# Patient Record
Sex: FEMALE | Race: WHITE | NOT HISPANIC OR LATINO | Employment: UNEMPLOYED
[De-identification: names, ages, dates, MRNs, and addresses within clinical notes are randomized per-mention and may not be internally consistent; named-entity substitution may affect disease eponyms.]

---

## 2018-06-07 ENCOUNTER — HOSPITAL (OUTPATIENT)
Dept: OTHER | Age: 63
End: 2018-06-07
Attending: INTERNAL MEDICINE

## 2019-12-30 PROCEDURE — 93018 CV STRESS TEST I&R ONLY: CPT | Performed by: INTERNAL MEDICINE

## 2019-12-30 PROCEDURE — 93016 CV STRESS TEST SUPVJ ONLY: CPT | Performed by: INTERNAL MEDICINE

## 2019-12-30 PROCEDURE — 78452 HT MUSCLE IMAGE SPECT MULT: CPT | Performed by: INTERNAL MEDICINE

## 2023-01-22 ENCOUNTER — ANESTHESIA EVENT (OUTPATIENT)
Dept: SURGERY | Age: 68
End: 2023-01-22

## 2023-01-23 ENCOUNTER — HOSPITAL ENCOUNTER (OUTPATIENT)
Age: 68
Discharge: HOME | End: 2023-01-23
Attending: OPHTHALMOLOGY | Admitting: OPHTHALMOLOGY

## 2023-01-23 ENCOUNTER — ANESTHESIA (OUTPATIENT)
Dept: SURGERY | Age: 68
End: 2023-01-23

## 2023-01-23 VITALS
SYSTOLIC BLOOD PRESSURE: 129 MMHG | DIASTOLIC BLOOD PRESSURE: 69 MMHG | OXYGEN SATURATION: 97 % | RESPIRATION RATE: 20 BRPM | WEIGHT: 157 LBS | HEART RATE: 65 BPM | HEIGHT: 63 IN | BODY MASS INDEX: 27.82 KG/M2 | TEMPERATURE: 96.8 F

## 2023-01-23 PROCEDURE — A66984 ANES EXTRACAPSULAR CATARACT REMOV W/INSR: Performed by: ANESTHESIOLOGY

## 2023-01-23 PROCEDURE — 10002800 HB RX 250 W HCPCS: Performed by: OPHTHALMOLOGY

## 2023-01-23 PROCEDURE — 10002801 HB RX 250 W/O HCPCS

## 2023-01-23 PROCEDURE — 13000037 HB COMPLEX CASE EACH ADD MINUTE: Performed by: OPHTHALMOLOGY

## 2023-01-23 PROCEDURE — 13000036 HB COMPLEX  CASE S/U + 1ST 15 MIN: Performed by: OPHTHALMOLOGY

## 2023-01-23 PROCEDURE — 10002801 HB RX 250 W/O HCPCS: Performed by: OPHTHALMOLOGY

## 2023-01-23 PROCEDURE — 13000006 HB ANESTHESIA MAC S/U + 1ST 15 MIN: Performed by: OPHTHALMOLOGY

## 2023-01-23 PROCEDURE — V2632 POST CHMBR INTRAOCULAR LENS: HCPCS | Performed by: OPHTHALMOLOGY

## 2023-01-23 PROCEDURE — 10002807 HB RX 258: Performed by: ANESTHESIOLOGY

## 2023-01-23 PROCEDURE — 10002803 HB RX 637: Performed by: OPHTHALMOLOGY

## 2023-01-23 PROCEDURE — 10006026 HB SUPPLY 276: Performed by: OPHTHALMOLOGY

## 2023-01-23 PROCEDURE — 10002803 HB RX 637

## 2023-01-23 PROCEDURE — 10006023 HB SUPPLY 272: Performed by: OPHTHALMOLOGY

## 2023-01-23 PROCEDURE — 13000007 HB ANESTHESIA MAC EA ADD MINUTE: Performed by: OPHTHALMOLOGY

## 2023-01-23 PROCEDURE — 10002800 HB RX 250 W HCPCS: Performed by: ANESTHESIOLOGY

## 2023-01-23 PROCEDURE — 13000001 HB PHASE II RECOVERY EA 30 MINUTES: Performed by: OPHTHALMOLOGY

## 2023-01-23 DEVICE — STERILE UV AND BLUE LIGHT FILTERING ACRYLIC FOLDABLE SINGLE-PIECE POSTERIOR CHAMBER LENSES WITH THE ULTRASERT® PRE-LOADED DELIVERY SYSTEM
Type: IMPLANTABLE DEVICE | Site: EYE | Status: FUNCTIONAL
Brand: ACRYSOF® ULTRASERT®

## 2023-01-23 RX ORDER — MIDAZOLAM HYDROCHLORIDE 1 MG/ML
INJECTION, SOLUTION INTRAMUSCULAR; INTRAVENOUS PRN
Status: DISCONTINUED | OUTPATIENT
Start: 2023-01-23 | End: 2023-01-23

## 2023-01-23 RX ORDER — VITAMIN B COMPLEX
TABLET ORAL DAILY
COMMUNITY

## 2023-01-23 RX ORDER — CYCLOPENTOLATE HYDROCHLORIDE 10 MG/ML
1 SOLUTION/ DROPS OPHTHALMIC
Status: COMPLETED | OUTPATIENT
Start: 2023-01-23 | End: 2023-01-23

## 2023-01-23 RX ORDER — PHENYLEPHRINE HCL - LIDOCAINE HCL 10; 15 MG/ML; MG/ML
INJECTION, SOLUTION INTRAOCULAR; OPHTHALMIC PRN
Status: DISCONTINUED | OUTPATIENT
Start: 2023-01-23 | End: 2023-01-23 | Stop reason: HOSPADM

## 2023-01-23 RX ORDER — BALANCED SALT SOLUTION 6.4; .75; .48; .3; 3.9; 1.7 MG/ML; MG/ML; MG/ML; MG/ML; MG/ML; MG/ML
SOLUTION OPHTHALMIC PRN
Status: DISCONTINUED | OUTPATIENT
Start: 2023-01-23 | End: 2023-01-23 | Stop reason: HOSPADM

## 2023-01-23 RX ORDER — SODIUM CHLORIDE 9 MG/ML
INJECTION, SOLUTION INTRAVENOUS CONTINUOUS PRN
Status: DISCONTINUED | OUTPATIENT
Start: 2023-01-23 | End: 2023-01-23

## 2023-01-23 RX ORDER — PROPOFOL 10 MG/ML
INJECTION, EMULSION INTRAVENOUS PRN
Status: DISCONTINUED | OUTPATIENT
Start: 2023-01-23 | End: 2023-01-23

## 2023-01-23 RX ORDER — LEVOTHYROXINE SODIUM 88 UG/1
CAPSULE ORAL DAILY
COMMUNITY

## 2023-01-23 RX ORDER — ACETAZOLAMIDE 500 MG/1
500 CAPSULE, EXTENDED RELEASE ORAL ONCE
Status: COMPLETED | OUTPATIENT
Start: 2023-01-23 | End: 2023-01-23

## 2023-01-23 RX ORDER — ACETAZOLAMIDE 500 MG/1
500 CAPSULE, EXTENDED RELEASE ORAL ONCE
Status: CANCELLED | OUTPATIENT
Start: 2023-01-23 | End: 2023-01-23

## 2023-01-23 RX ORDER — TROPICAMIDE 10 MG/ML
1 SOLUTION/ DROPS OPHTHALMIC
Status: COMPLETED | OUTPATIENT
Start: 2023-01-23 | End: 2023-01-23

## 2023-01-23 RX ORDER — MOXIFLOXACIN 5 MG/ML
SOLUTION/ DROPS OPHTHALMIC PRN
Status: DISCONTINUED | OUTPATIENT
Start: 2023-01-23 | End: 2023-01-23 | Stop reason: HOSPADM

## 2023-01-23 RX ADMIN — TROPICAMIDE 1 DROP: 10 SOLUTION/ DROPS OPHTHALMIC at 08:35

## 2023-01-23 RX ADMIN — SODIUM CHLORIDE: 9 INJECTION, SOLUTION INTRAVENOUS at 09:38

## 2023-01-23 RX ADMIN — ACETAZOLAMIDE 500 MG: 500 CAPSULE, EXTENDED RELEASE ORAL at 10:32

## 2023-01-23 RX ADMIN — PROPOFOL 20 MG: 10 INJECTION, EMULSION INTRAVENOUS at 09:52

## 2023-01-23 RX ADMIN — CYCLOPENTOLATE HYDROCHLORIDE 1 DROP: 10 SOLUTION/ DROPS OPHTHALMIC at 08:30

## 2023-01-23 RX ADMIN — CYCLOPENTOLATE HYDROCHLORIDE 1 DROP: 10 SOLUTION/ DROPS OPHTHALMIC at 08:40

## 2023-01-23 RX ADMIN — PROPOFOL 30 MG: 10 INJECTION, EMULSION INTRAVENOUS at 09:50

## 2023-01-23 RX ADMIN — TROPICAMIDE 1 DROP: 10 SOLUTION/ DROPS OPHTHALMIC at 08:40

## 2023-01-23 RX ADMIN — CYCLOPENTOLATE HYDROCHLORIDE 1 DROP: 10 SOLUTION/ DROPS OPHTHALMIC at 08:35

## 2023-01-23 RX ADMIN — MIDAZOLAM HYDROCHLORIDE 1 MG: 1 INJECTION, SOLUTION INTRAMUSCULAR; INTRAVENOUS at 09:50

## 2023-01-23 RX ADMIN — TROPICAMIDE 1 DROP: 10 SOLUTION/ DROPS OPHTHALMIC at 08:30

## 2023-01-23 ASSESSMENT — PAIN SCALES - GENERAL
PAINLEVEL_OUTOF10: 0
PAINLEVEL_OUTOF10: 0
PAINLEVEL_OUTOF10: 1

## 2023-03-13 ENCOUNTER — ANESTHESIA EVENT (OUTPATIENT)
Dept: SURGERY | Age: 68
End: 2023-03-13

## 2023-03-13 ENCOUNTER — HOSPITAL ENCOUNTER (OUTPATIENT)
Age: 68
Discharge: HOME | End: 2023-03-13
Attending: OPHTHALMOLOGY | Admitting: OPHTHALMOLOGY

## 2023-03-13 ENCOUNTER — ANESTHESIA (OUTPATIENT)
Dept: SURGERY | Age: 68
End: 2023-03-13

## 2023-03-13 VITALS
DIASTOLIC BLOOD PRESSURE: 72 MMHG | OXYGEN SATURATION: 97 % | WEIGHT: 143 LBS | HEART RATE: 62 BPM | TEMPERATURE: 96.8 F | SYSTOLIC BLOOD PRESSURE: 118 MMHG | HEIGHT: 64 IN | RESPIRATION RATE: 18 BRPM | BODY MASS INDEX: 24.41 KG/M2

## 2023-03-13 PROCEDURE — 13000007 HB ANESTHESIA MAC EA ADD MINUTE: Performed by: OPHTHALMOLOGY

## 2023-03-13 PROCEDURE — 10002800 HB RX 250 W HCPCS: Performed by: ANESTHESIOLOGY

## 2023-03-13 PROCEDURE — 10002801 HB RX 250 W/O HCPCS: Performed by: OPHTHALMOLOGY

## 2023-03-13 PROCEDURE — 10002803 HB RX 637

## 2023-03-13 PROCEDURE — 13000037 HB COMPLEX CASE EACH ADD MINUTE: Performed by: OPHTHALMOLOGY

## 2023-03-13 PROCEDURE — 10002801 HB RX 250 W/O HCPCS

## 2023-03-13 PROCEDURE — V2632 POST CHMBR INTRAOCULAR LENS: HCPCS | Performed by: OPHTHALMOLOGY

## 2023-03-13 PROCEDURE — 10002807 HB RX 258: Performed by: ANESTHESIOLOGY

## 2023-03-13 PROCEDURE — 13000001 HB PHASE II RECOVERY EA 30 MINUTES: Performed by: OPHTHALMOLOGY

## 2023-03-13 PROCEDURE — 13000006 HB ANESTHESIA MAC S/U + 1ST 15 MIN: Performed by: OPHTHALMOLOGY

## 2023-03-13 PROCEDURE — A66984 ANES EXTRACAPSULAR CATARACT REMOV W/INSR: Performed by: ANESTHESIOLOGY

## 2023-03-13 PROCEDURE — 13000036 HB COMPLEX  CASE S/U + 1ST 15 MIN: Performed by: OPHTHALMOLOGY

## 2023-03-13 PROCEDURE — 10006023 HB SUPPLY 272: Performed by: OPHTHALMOLOGY

## 2023-03-13 PROCEDURE — 10002803 HB RX 637: Performed by: OPHTHALMOLOGY

## 2023-03-13 PROCEDURE — 10002800 HB RX 250 W HCPCS: Performed by: OPHTHALMOLOGY

## 2023-03-13 PROCEDURE — 10006026 HB SUPPLY 276: Performed by: OPHTHALMOLOGY

## 2023-03-13 DEVICE — STERILE UV AND BLUE LIGHT FILTERING ACRYLIC FOLDABLE SINGLE-PIECE POSTERIOR CHAMBER LENSES WITH THE ULTRASERT® PRE-LOADED DELIVERY SYSTEM
Type: IMPLANTABLE DEVICE | Site: EYE | Status: FUNCTIONAL
Brand: ACRYSOF® ULTRASERT®

## 2023-03-13 RX ORDER — BUPIVACAINE HYDROCHLORIDE 5 MG/ML
INJECTION, SOLUTION EPIDURAL; INTRACAUDAL PRN
Status: DISCONTINUED | OUTPATIENT
Start: 2023-03-13 | End: 2023-03-13 | Stop reason: HOSPADM

## 2023-03-13 RX ORDER — PROPOFOL 10 MG/ML
INJECTION, EMULSION INTRAVENOUS PRN
Status: DISCONTINUED | OUTPATIENT
Start: 2023-03-13 | End: 2023-03-13

## 2023-03-13 RX ORDER — MIDAZOLAM HYDROCHLORIDE 1 MG/ML
INJECTION, SOLUTION INTRAMUSCULAR; INTRAVENOUS PRN
Status: DISCONTINUED | OUTPATIENT
Start: 2023-03-13 | End: 2023-03-13

## 2023-03-13 RX ORDER — BALANCED SALT SOLUTION 6.4; .75; .48; .3; 3.9; 1.7 MG/ML; MG/ML; MG/ML; MG/ML; MG/ML; MG/ML
SOLUTION OPHTHALMIC PRN
Status: DISCONTINUED | OUTPATIENT
Start: 2023-03-13 | End: 2023-03-13 | Stop reason: HOSPADM

## 2023-03-13 RX ORDER — TROPICAMIDE 10 MG/ML
1 SOLUTION/ DROPS OPHTHALMIC
Status: COMPLETED | OUTPATIENT
Start: 2023-03-13 | End: 2023-03-13

## 2023-03-13 RX ORDER — MOXIFLOXACIN 5 MG/ML
SOLUTION/ DROPS OPHTHALMIC PRN
Status: DISCONTINUED | OUTPATIENT
Start: 2023-03-13 | End: 2023-03-13 | Stop reason: HOSPADM

## 2023-03-13 RX ORDER — PHENYLEPHRINE HCL - LIDOCAINE HCL 10; 15 MG/ML; MG/ML
INJECTION, SOLUTION INTRAOCULAR; OPHTHALMIC PRN
Status: DISCONTINUED | OUTPATIENT
Start: 2023-03-13 | End: 2023-03-13 | Stop reason: HOSPADM

## 2023-03-13 RX ORDER — ACETAZOLAMIDE 500 MG/1
500 CAPSULE, EXTENDED RELEASE ORAL ONCE
Status: COMPLETED | OUTPATIENT
Start: 2023-03-13 | End: 2023-03-13

## 2023-03-13 RX ORDER — PHENYLEPHRINE HYDROCHLORIDE 25 MG/ML
1 SOLUTION/ DROPS OPHTHALMIC
Status: COMPLETED | OUTPATIENT
Start: 2023-03-13 | End: 2023-03-13

## 2023-03-13 RX ORDER — LIDOCAINE HYDROCHLORIDE 20 MG/ML
INJECTION, SOLUTION INFILTRATION; PERINEURAL PRN
Status: DISCONTINUED | OUTPATIENT
Start: 2023-03-13 | End: 2023-03-13 | Stop reason: HOSPADM

## 2023-03-13 RX ORDER — SODIUM CHLORIDE, SODIUM LACTATE, POTASSIUM CHLORIDE, CALCIUM CHLORIDE 600; 310; 30; 20 MG/100ML; MG/100ML; MG/100ML; MG/100ML
INJECTION, SOLUTION INTRAVENOUS CONTINUOUS PRN
Status: DISCONTINUED | OUTPATIENT
Start: 2023-03-13 | End: 2023-03-13

## 2023-03-13 RX ORDER — ACETAZOLAMIDE 500 MG/1
500 CAPSULE, EXTENDED RELEASE ORAL ONCE
Status: CANCELLED | OUTPATIENT
Start: 2023-03-13 | End: 2023-03-13

## 2023-03-13 RX ORDER — CYCLOPENTOLATE HYDROCHLORIDE 10 MG/ML
1 SOLUTION/ DROPS OPHTHALMIC
Status: COMPLETED | OUTPATIENT
Start: 2023-03-13 | End: 2023-03-13

## 2023-03-13 RX ADMIN — MIDAZOLAM HYDROCHLORIDE 2 MG: 1 INJECTION, SOLUTION INTRAMUSCULAR; INTRAVENOUS at 13:05

## 2023-03-13 RX ADMIN — PHENYLEPHRINE HYDROCHLORIDE 1 DROP: 25 SOLUTION/ DROPS OPHTHALMIC at 11:07

## 2023-03-13 RX ADMIN — CYCLOPENTOLATE HYDROCHLORIDE 1 DROP: 10 SOLUTION/ DROPS OPHTHALMIC at 11:07

## 2023-03-13 RX ADMIN — TROPICAMIDE 1 DROP: 10 SOLUTION/ DROPS OPHTHALMIC at 11:07

## 2023-03-13 RX ADMIN — TROPICAMIDE 1 DROP: 10 SOLUTION/ DROPS OPHTHALMIC at 10:59

## 2023-03-13 RX ADMIN — PHENYLEPHRINE HYDROCHLORIDE 1 DROP: 25 SOLUTION/ DROPS OPHTHALMIC at 11:00

## 2023-03-13 RX ADMIN — PROPOFOL 50 MG: 10 INJECTION, EMULSION INTRAVENOUS at 13:06

## 2023-03-13 RX ADMIN — CYCLOPENTOLATE HYDROCHLORIDE 1 DROP: 10 SOLUTION/ DROPS OPHTHALMIC at 10:59

## 2023-03-13 RX ADMIN — SODIUM CHLORIDE, POTASSIUM CHLORIDE, SODIUM LACTATE AND CALCIUM CHLORIDE: 600; 310; 30; 20 INJECTION, SOLUTION INTRAVENOUS at 12:58

## 2023-03-13 RX ADMIN — CYCLOPENTOLATE HYDROCHLORIDE 1 DROP: 10 SOLUTION/ DROPS OPHTHALMIC at 10:51

## 2023-03-13 RX ADMIN — ACETAZOLAMIDE 500 MG: 500 CAPSULE, EXTENDED RELEASE ORAL at 13:57

## 2023-03-13 RX ADMIN — PHENYLEPHRINE HYDROCHLORIDE 1 DROP: 25 SOLUTION/ DROPS OPHTHALMIC at 10:51

## 2023-03-13 RX ADMIN — TROPICAMIDE 1 DROP: 10 SOLUTION/ DROPS OPHTHALMIC at 10:51

## 2023-03-13 SDOH — SOCIAL STABILITY: SOCIAL INSECURITY: RISK FACTORS: AGE

## 2023-03-13 ASSESSMENT — PAIN SCALES - GENERAL
PAINLEVEL_OUTOF10: 0
PAINLEVEL_OUTOF10: 0

## 2024-11-26 ENCOUNTER — OFFICE VISIT (OUTPATIENT)
Dept: INTERNAL MEDICINE CLINIC | Facility: CLINIC | Age: 69
End: 2024-11-26
Payer: MEDICAID

## 2024-11-26 VITALS
HEIGHT: 63 IN | BODY MASS INDEX: 28 KG/M2 | SYSTOLIC BLOOD PRESSURE: 110 MMHG | TEMPERATURE: 98 F | HEART RATE: 62 BPM | RESPIRATION RATE: 18 BRPM | WEIGHT: 158 LBS | OXYGEN SATURATION: 96 % | DIASTOLIC BLOOD PRESSURE: 58 MMHG

## 2024-11-26 DIAGNOSIS — Z00.00 ANNUAL PHYSICAL EXAM: Primary | ICD-10-CM

## 2024-11-26 DIAGNOSIS — M81.0 AGE-RELATED OSTEOPOROSIS WITHOUT CURRENT PATHOLOGICAL FRACTURE: ICD-10-CM

## 2024-11-26 DIAGNOSIS — E03.9 HYPOTHYROIDISM, UNSPECIFIED TYPE: ICD-10-CM

## 2024-11-26 DIAGNOSIS — Z78.0 POST-MENOPAUSAL: ICD-10-CM

## 2024-11-26 DIAGNOSIS — Z53.20 COLONOSCOPY REFUSED: ICD-10-CM

## 2024-11-26 RX ORDER — ASPIRIN 81 MG/1
81 TABLET ORAL DAILY
COMMUNITY

## 2024-11-26 RX ORDER — LEVOTHYROXINE SODIUM 100 UG/1
100 TABLET ORAL
COMMUNITY
Start: 2024-09-16

## 2024-11-26 RX ORDER — VITAMIN B COMPLEX
TABLET ORAL DAILY
COMMUNITY

## 2024-11-30 NOTE — PROGRESS NOTES
Subjective:     Patient ID: Chiquita Trevizo is a 69 year old female.    HPI    Patient comes in first time to establish care denies any complaints today taking levothyroxine for her hypothyroidism patient also has history of osteoporosis was supposed to be on Prolia for treatment she could not tolerate the medication she has not had a bone density scan done for over 2 patient not interested in colonoscopy but willing to do the stool test.    History/Other:   Review of Systems   Constitutional: Negative.    HENT: Negative.     Eyes: Negative.    Respiratory: Negative.     Cardiovascular: Negative.    Gastrointestinal: Negative.    Genitourinary: Negative.    Musculoskeletal: Negative.    Skin: Negative.    Neurological: Negative.    Psychiatric/Behavioral: Negative.       Current Outpatient Medications   Medication Sig Dispense Refill    levothyroxine 100 MCG Oral Tab Take 1 tablet (100 mcg total) by mouth before breakfast.      aspirin 81 MG Oral Tab EC Take 1 tablet (81 mg total) by mouth daily.      Cholecalciferol (VITAMIN D) 25 MCG (1000 UT) Oral Tab Take by mouth daily.       Allergies:Allergies[1]    No past medical history on file.   History reviewed. No pertinent surgical history.   History reviewed. No pertinent family history.   Social History:   Social History     Socioeconomic History    Marital status:    Tobacco Use    Smoking status: Never     Passive exposure: Never    Smokeless tobacco: Never   Vaping Use    Vaping status: Never Used   Substance and Sexual Activity    Alcohol use: Never    Drug use: Never        Objective:   Physical Exam  Vitals and nursing note reviewed.   Constitutional:       Appearance: She is well-developed.   HENT:      Head: Normocephalic and atraumatic.      Right Ear: External ear normal.      Left Ear: External ear normal.      Nose: Nose normal.   Eyes:      Conjunctiva/sclera: Conjunctivae normal.      Pupils: Pupils are equal, round, and reactive to light.    Cardiovascular:      Rate and Rhythm: Normal rate and regular rhythm.      Heart sounds: Normal heart sounds.   Pulmonary:      Effort: Pulmonary effort is normal.      Breath sounds: Normal breath sounds.   Abdominal:      General: Bowel sounds are normal.      Palpations: Abdomen is soft.   Genitourinary:     Vagina: Normal.   Musculoskeletal:         General: Normal range of motion.      Cervical back: Normal range of motion and neck supple.   Skin:     General: Skin is warm and dry.   Neurological:      Mental Status: She is alert and oriented to person, place, and time.      Deep Tendon Reflexes: Reflexes are normal and symmetric.   Psychiatric:         Behavior: Behavior normal.         Thought Content: Thought content normal.         Judgment: Judgment normal.         Assessment & Plan:   1. Annual physical exam exam is okay will order labs   2. Hypothyroidism, unspecified type will retest continue current treatment    3. Colonoscopy refused will order stool test   4. Post-menopausal will refer to endocrine we will also order bone density scan   5. Age-related osteoporosis without current pathological fracture as above follow-up with endocrine get the bone density scan done       Orders Placed This Encounter   Procedures    CBC With Differential With Platelet    Comp Metabolic Panel (14)    Lipid Panel    TSH W Reflex To Free T4    Urinalysis, Routine    Occult Blood, Fecal, Immunoassay (Blue cards) [E]    INFLUENZA VAC HIGH DOSE PRSV FREE       Meds This Visit:  Requested Prescriptions      No prescriptions requested or ordered in this encounter       Imaging & Referrals:  ENDOCRINOLOGY - INTERNAL  INFLUENZA VAC HIGH DOSE PRSV FREE  XR DEXA BONE DENSITOMETRY (CPT=77080)  EKG 12-LEAD            [1] No Known Allergies

## 2024-12-02 ENCOUNTER — LAB ENCOUNTER (OUTPATIENT)
Dept: LAB | Facility: HOSPITAL | Age: 69
End: 2024-12-02
Attending: INTERNAL MEDICINE
Payer: MEDICAID

## 2024-12-02 DIAGNOSIS — R73.09 ELEVATED GLUCOSE LEVEL: Primary | ICD-10-CM

## 2024-12-02 DIAGNOSIS — Z00.00 ANNUAL PHYSICAL EXAM: ICD-10-CM

## 2024-12-02 DIAGNOSIS — Z53.20 COLONOSCOPY REFUSED: ICD-10-CM

## 2024-12-02 DIAGNOSIS — R73.09 ELEVATED GLUCOSE LEVEL: ICD-10-CM

## 2024-12-02 DIAGNOSIS — E03.9 HYPOTHYROIDISM, UNSPECIFIED TYPE: ICD-10-CM

## 2024-12-02 DIAGNOSIS — E83.52 SERUM CALCIUM ELEVATED: ICD-10-CM

## 2024-12-02 DIAGNOSIS — Z78.0 POST-MENOPAUSAL: ICD-10-CM

## 2024-12-02 DIAGNOSIS — M81.0 AGE-RELATED OSTEOPOROSIS WITHOUT CURRENT PATHOLOGICAL FRACTURE: ICD-10-CM

## 2024-12-02 LAB
ALBUMIN SERPL-MCNC: 4.7 G/DL (ref 3.2–4.8)
ALBUMIN/GLOB SERPL: 1.7 {RATIO} (ref 1–2)
ALP LIVER SERPL-CCNC: 73 U/L
ALT SERPL-CCNC: 13 U/L
ANION GAP SERPL CALC-SCNC: 4 MMOL/L (ref 0–18)
AST SERPL-CCNC: 17 U/L (ref ?–34)
ATRIAL RATE: 64 BPM
BASOPHILS # BLD AUTO: 0.09 X10(3) UL (ref 0–0.2)
BASOPHILS NFR BLD AUTO: 1.1 %
BILIRUB SERPL-MCNC: 0.7 MG/DL (ref 0.2–1.1)
BILIRUB UR QL: NEGATIVE
BUN BLD-MCNC: 14 MG/DL (ref 9–23)
BUN/CREAT SERPL: 14.6 (ref 10–20)
CALCIUM BLD-MCNC: 10.7 MG/DL (ref 8.7–10.4)
CHLORIDE SERPL-SCNC: 108 MMOL/L (ref 98–112)
CHOLEST SERPL-MCNC: 215 MG/DL (ref ?–200)
CLARITY UR: CLEAR
CO2 SERPL-SCNC: 29 MMOL/L (ref 21–32)
CREAT BLD-MCNC: 0.96 MG/DL
DEPRECATED RDW RBC AUTO: 42.9 FL (ref 35.1–46.3)
EGFRCR SERPLBLD CKD-EPI 2021: 64 ML/MIN/1.73M2 (ref 60–?)
EOSINOPHIL # BLD AUTO: 0.3 X10(3) UL (ref 0–0.7)
EOSINOPHIL NFR BLD AUTO: 3.8 %
ERYTHROCYTE [DISTWIDTH] IN BLOOD BY AUTOMATED COUNT: 13.2 % (ref 11–15)
EST. AVERAGE GLUCOSE BLD GHB EST-MCNC: 137 MG/DL (ref 68–126)
FASTING PATIENT LIPID ANSWER: YES
FASTING STATUS PATIENT QL REPORTED: YES
GLOBULIN PLAS-MCNC: 2.8 G/DL (ref 2–3.5)
GLUCOSE BLD-MCNC: 115 MG/DL (ref 70–99)
GLUCOSE UR-MCNC: NORMAL MG/DL
HBA1C MFR BLD: 6.4 % (ref ?–5.7)
HCT VFR BLD AUTO: 40.9 %
HDLC SERPL-MCNC: 52 MG/DL (ref 40–59)
HGB BLD-MCNC: 13.8 G/DL
HGB UR QL STRIP.AUTO: NEGATIVE
IMM GRANULOCYTES # BLD AUTO: 0.04 X10(3) UL (ref 0–1)
IMM GRANULOCYTES NFR BLD: 0.5 %
KETONES UR-MCNC: NEGATIVE MG/DL
LDLC SERPL CALC-MCNC: 133 MG/DL (ref ?–100)
LEUKOCYTE ESTERASE UR QL STRIP.AUTO: NEGATIVE
LYMPHOCYTES # BLD AUTO: 3.33 X10(3) UL (ref 1–4)
LYMPHOCYTES NFR BLD AUTO: 42.1 %
MCH RBC QN AUTO: 30.3 PG (ref 26–34)
MCHC RBC AUTO-ENTMCNC: 33.7 G/DL (ref 31–37)
MCV RBC AUTO: 89.9 FL
MONOCYTES # BLD AUTO: 0.68 X10(3) UL (ref 0.1–1)
MONOCYTES NFR BLD AUTO: 8.6 %
NEUTROPHILS # BLD AUTO: 3.47 X10 (3) UL (ref 1.5–7.7)
NEUTROPHILS # BLD AUTO: 3.47 X10(3) UL (ref 1.5–7.7)
NEUTROPHILS NFR BLD AUTO: 43.9 %
NITRITE UR QL STRIP.AUTO: NEGATIVE
NONHDLC SERPL-MCNC: 163 MG/DL (ref ?–130)
OSMOLALITY SERPL CALC.SUM OF ELEC: 293 MOSM/KG (ref 275–295)
P AXIS: 58 DEGREES
P-R INTERVAL: 182 MS
PH UR: 6 [PH] (ref 5–8)
PLATELET # BLD AUTO: 255 10(3)UL (ref 150–450)
POTASSIUM SERPL-SCNC: 3.9 MMOL/L (ref 3.5–5.1)
PROT SERPL-MCNC: 7.5 G/DL (ref 5.7–8.2)
PROT UR-MCNC: NEGATIVE MG/DL
Q-T INTERVAL: 402 MS
QRS DURATION: 78 MS
QTC CALCULATION (BEZET): 414 MS
R AXIS: -65 DEGREES
RBC # BLD AUTO: 4.55 X10(6)UL
SODIUM SERPL-SCNC: 141 MMOL/L (ref 136–145)
SP GR UR STRIP: 1.01 (ref 1–1.03)
T AXIS: 56 DEGREES
TRIGL SERPL-MCNC: 166 MG/DL (ref 30–149)
TSI SER-ACNC: 1.34 UIU/ML (ref 0.55–4.78)
UROBILINOGEN UR STRIP-ACNC: NORMAL
VENTRICULAR RATE: 64 BPM
VLDLC SERPL CALC-MCNC: 30 MG/DL (ref 0–30)
WBC # BLD AUTO: 7.9 X10(3) UL (ref 4–11)

## 2024-12-02 PROCEDURE — 84443 ASSAY THYROID STIM HORMONE: CPT

## 2024-12-02 PROCEDURE — 80061 LIPID PANEL: CPT

## 2024-12-02 PROCEDURE — 85025 COMPLETE CBC W/AUTO DIFF WBC: CPT

## 2024-12-02 PROCEDURE — 80053 COMPREHEN METABOLIC PANEL: CPT

## 2024-12-02 PROCEDURE — 36415 COLL VENOUS BLD VENIPUNCTURE: CPT

## 2024-12-02 PROCEDURE — 81003 URINALYSIS AUTO W/O SCOPE: CPT

## 2024-12-02 PROCEDURE — 93005 ELECTROCARDIOGRAM TRACING: CPT

## 2024-12-02 PROCEDURE — 83036 HEMOGLOBIN GLYCOSYLATED A1C: CPT

## 2024-12-02 PROCEDURE — 93010 ELECTROCARDIOGRAM REPORT: CPT | Performed by: INTERNAL MEDICINE

## 2024-12-02 RX ORDER — SIMVASTATIN 10 MG
10 TABLET ORAL NIGHTLY
Qty: 90 TABLET | Refills: 1 | Status: SHIPPED | OUTPATIENT
Start: 2024-12-02

## 2024-12-04 ENCOUNTER — TELEPHONE (OUTPATIENT)
Dept: ENDOCRINOLOGY CLINIC | Facility: CLINIC | Age: 69
End: 2024-12-04

## 2024-12-04 ENCOUNTER — OFFICE VISIT (OUTPATIENT)
Dept: ENDOCRINOLOGY CLINIC | Facility: CLINIC | Age: 69
End: 2024-12-04

## 2024-12-04 VITALS
BODY MASS INDEX: 27 KG/M2 | DIASTOLIC BLOOD PRESSURE: 81 MMHG | HEART RATE: 88 BPM | WEIGHT: 154 LBS | SYSTOLIC BLOOD PRESSURE: 148 MMHG

## 2024-12-04 DIAGNOSIS — E06.3 HYPOTHYROIDISM DUE TO HASHIMOTO'S THYROIDITIS: Primary | ICD-10-CM

## 2024-12-04 DIAGNOSIS — M81.0 AGE-RELATED OSTEOPOROSIS WITHOUT CURRENT PATHOLOGICAL FRACTURE: ICD-10-CM

## 2024-12-04 DIAGNOSIS — R73.01 IMPAIRED FASTING GLUCOSE: ICD-10-CM

## 2024-12-04 PROCEDURE — 99204 OFFICE O/P NEW MOD 45 MIN: CPT | Performed by: INTERNAL MEDICINE

## 2024-12-04 RX ORDER — LEVOTHYROXINE SODIUM 100 UG/1
100 TABLET ORAL
Qty: 90 TABLET | Refills: 3 | Status: SHIPPED | OUTPATIENT
Start: 2024-12-04

## 2024-12-04 NOTE — TELEPHONE ENCOUNTER
Submitted prolia IV via [employer]. Awaiting 3-5 business days. Please schedule RN visit once verification is completed. Pts first Prolia IV.

## 2024-12-04 NOTE — PROGRESS NOTES
Name: Chiquita Trevizo  Date: 12/4/2024    Referring Physician: Rod Moreno    Chief Complaint   Patient presents with    Consult     Thyroid issues       HISTORY OF PRESENT ILLNESS   Chiquita Trevizo is a 69 year old female who presents for   Chief Complaint   Patient presents with    Consult     Thyroid issues     #1 Hypothyroidism   70 y/o F presents for initial evaluation of hypothyroidism.  She is currently maintained on Levothyroxine 100mcg PO daily, taking in AM an waiting 30-60 min before eating.  She has been on medication for approximately 8 years.     She has been followed with yearly US but no clear history of nodules.  No previous FNA. No previous history of surgical procedure.     [place].     Compression Symptoms:  Dysphagia: No  Dyspnea: No  Voice Change: No  Anterior Neck Pain: No     #2 Osteoporosis     She was diagnosed with bone loss and scheduled for DEXA on 12/6.  No h/o fractures.  No h/o nephrolithiasis.     She was treated with oral bisphosphonate but developed side effects.  She was then transitioned to prolia therapy, last injection 5/2024.  She has been on therapy for around 5 years, she doesn't recall exact date.      REVIEW OF SYSTEMS  Eyes: no change in vision  Neurologic: no headache, generalized or focal weakness or numbness.  Head: normal  ENT: normal  Lungs: no shortness of breath, wheezing or LUND  Cardiovascular:  no chest pain or palpitations  Gastrointestinal:  no abdominal pain, bowel movement problems  Musculoskeletal: no muscle pain or arthralgia  /Gyne: no frequency or discomfort while urinating  Psychiatric:  no acute distress, anxiety  or depression  Skin: normal moisturized skin    Medications:     Current Outpatient Medications:     simvastatin 10 MG Oral Tab, Take 1 tablet (10 mg total) by mouth nightly., Disp: 90 tablet, Rfl: 1    levothyroxine 100 MCG Oral Tab, Take 1 tablet (100 mcg total) by mouth before breakfast., Disp: , Rfl:     aspirin  81 MG Oral Tab EC, Take 1 tablet (81 mg total) by mouth daily., Disp: , Rfl:     Cholecalciferol (VITAMIN D) 25 MCG (1000 UT) Oral Tab, Take by mouth daily., Disp: , Rfl:      Allergies:   Allergies[1]    Social History:   Social History     Socioeconomic History    Marital status:    Tobacco Use    Smoking status: Never     Passive exposure: Never    Smokeless tobacco: Never   Vaping Use    Vaping status: Never Used   Substance and Sexual Activity    Alcohol use: Never    Drug use: Never       Medical History:   No past medical history on file.    Surgical history:   No past surgical history on file.    PHYSICAL EXAMINATION:  /81   Pulse 88   Wt 154 lb (69.9 kg)   BMI 27.28 kg/m²     General Appearance:  Alert, in no acute distress, well developed  Eyes: normal conjunctivae, sclera.  Ears/Nose/Mouth/Throat/Neck:  normal hearing, normal speech and diffusely enlarged thyroid gland  Neck: Trachea midline  Neurologic: sensory grossly intact and motor grossly intact  Respiratory:  clear to auscultation bilaterally  Cardiovascular:  regular rate, rhythm, , no murmurs, S3 or S4  Gastrointestinal:  normal bowel sounds  Breasts:  normal  Musculoskeletal:  normal muscle strength and tone  PV: normal pulses of carotids, pedals  Skin:  normal moisture and skin texture  Hair & Nails:  normal scalp hair     Neuro:  sensory grossly intact and motor grossly intact  Psychiatric:  oriented to time, self, and place  Nutritional:  no abnormal weight gain or loss    ASSESSMENT/PLAN:    Hypothyroidism  - Discussed diagnosis with patient  - Discussed importance of taking long term medication  - Continue Levothyroxine 100mcg PO daily   - Discussed taking in AM and waiting 30-60 min before eating  - Normal TFTs   - Check US to evaluate for nodules     2. Osteoporosis   - Discussed importance of treatment  - Side effects with oral bisphosphonate  - She has been on long term prolia therapy for approximately 5 years   - Repeat  injection due in December   - Can not stop injections without transition or will be at risk for rebound bone loss   - DEXA scheduled     3. Impaired Fasting Glucose  - Discussed diagnosis   - Discussed importance of changing diet and decrease CHO intake  - She will work on diet and recheck HgA1c in 3 months  - If not improved then will consider appt with Qiana SHUKLA     RTC 6 months         12/4/2024  Ana Grullon MD       [1] No Known Allergies

## 2024-12-09 NOTE — TELEPHONE ENCOUNTER
Received SOB VIA FAX dated on 12/06/24     PA REQUIRED    OOPCOST; 0%    FACILITY FEE; NA    ADMIN FEE;0%

## 2024-12-09 NOTE — TELEPHONE ENCOUNTER
Requested: (Denosumab) Prolia     CoverMyMeds Used: No    Key: N/a    Sig: Inject 60mg/ mL into the skin every 6 months.     DX Code: M81.0    Case Number/Pending Ref#: N/a      Routing over to PA Dept for assistance, thanks.

## 2024-12-26 ENCOUNTER — HOSPITAL ENCOUNTER (OUTPATIENT)
Dept: BONE DENSITY | Facility: HOSPITAL | Age: 69
Discharge: HOME | End: 2024-12-26
Attending: INTERNAL MEDICINE
Payer: MEDICAID

## 2024-12-26 DIAGNOSIS — M81.0 AGE-RELATED OSTEOPOROSIS WITHOUT CURRENT PATHOLOGICAL FRACTURE: ICD-10-CM

## 2024-12-26 DIAGNOSIS — Z78.0 POST-MENOPAUSAL: ICD-10-CM

## 2024-12-26 PROCEDURE — 77080 DXA BONE DENSITY AXIAL: CPT | Performed by: INTERNAL MEDICINE

## 2024-12-27 ENCOUNTER — NURSE ONLY (OUTPATIENT)
Dept: ENDOCRINOLOGY CLINIC | Facility: CLINIC | Age: 69
End: 2024-12-27
Payer: MEDICARE

## 2024-12-27 DIAGNOSIS — M81.0 AGE-RELATED OSTEOPOROSIS WITHOUT CURRENT PATHOLOGICAL FRACTURE: Primary | ICD-10-CM

## 2024-12-27 PROCEDURE — 96372 THER/PROPH/DIAG INJ SC/IM: CPT | Performed by: INTERNAL MEDICINE

## 2024-12-27 NOTE — PROGRESS NOTES
Patient seen today for prolia injection. Injection given to the left upper arm. Patient tolerated well.   used: Yes, ID: [id number] - [place]   Written medication information sheet provided: Yes    Discussed most common side effects of: bone and muscle pain, joint pain, dizziness, headache. Side effects typically only last up to 1 week.     Call office or be seen in ER with any of the following severe side effects: signs of allergic reaction (hives, difficulty breathing, redness or swelling at injection site, chest pain, shortness of breath), low blood calcium, osteonecrosis of the jaw.    Discussed with the patient if he/she plans on having any major dental work done to make sure their dentist and endocrinology provider is aware that they are taking prolia prior to treatment. This is due to increased risk of osteonecrosis or infection of the jaw while on this medication.     Today this is the 1st injection.  Last prolia injection on: N/A  Summary of benefits completed: Yes  PA required/completed: Per TE on 12/4.   Last Prolia protocol labs: Per LOV on 12/4, per MD, ok for patient to receive prolia.   Last Dexa scan: 12/26/24  Patient taking vitamin D supplementation: D3, doesn't know exact dose  Patient taking calcium supplementation: No      Patient advised to schedule 6 month follow up with MD, on or after 6/28/25. Pt scheduled on 6/30/25 with MD.   Next labs due: before next visit with MD   Future labs ordered: Yes      Staff message placed to MA pool ([facility] ENDO Medical Assistant) to perform repeat prolia insurance check in 4 months.

## 2025-01-13 ENCOUNTER — TELEPHONE (OUTPATIENT)
Dept: INTERNAL MEDICINE CLINIC | Facility: CLINIC | Age: 70
End: 2025-01-13

## 2025-01-13 DIAGNOSIS — H91.93 HEARING DIFFICULTY OF BOTH EARS: Primary | ICD-10-CM

## 2025-01-13 DIAGNOSIS — H61.23 BILATERAL IMPACTED CERUMEN: ICD-10-CM

## 2025-01-13 NOTE — TELEPHONE ENCOUNTER
Called the patients  to give results.     Patients  is asking for a referral to an ear doctor for him and his wife. He states he was informed by Dr. Cutler at his appointment that they both needed to see an ear doctor due to a build up of wax.     Dr. Moreno please advise - do they need to see ENT? If so, referral pended for your approval. Needs diagnosis.

## 2025-01-13 NOTE — TELEPHONE ENCOUNTER
Left  detailed Voicemail for Mitchel Ordaz that Dr Moreno sent referral for patient and spouse to ENT Dr Dede Sheffield, office number provided. Call back our office with any questions. Office phone number provided with office telephone hours.

## 2025-01-17 ENCOUNTER — OFFICE VISIT (OUTPATIENT)
Dept: OTOLARYNGOLOGY | Facility: CLINIC | Age: 70
End: 2025-01-17

## 2025-01-17 ENCOUNTER — OFFICE VISIT (OUTPATIENT)
Dept: INTERNAL MEDICINE CLINIC | Facility: CLINIC | Age: 70
End: 2025-01-17

## 2025-01-17 ENCOUNTER — HOSPITAL ENCOUNTER (OUTPATIENT)
Dept: GENERAL RADIOLOGY | Facility: HOSPITAL | Age: 70
Discharge: HOME | End: 2025-01-17
Attending: INTERNAL MEDICINE
Payer: MEDICARE

## 2025-01-17 VITALS
TEMPERATURE: 98 F | OXYGEN SATURATION: 98 % | DIASTOLIC BLOOD PRESSURE: 60 MMHG | SYSTOLIC BLOOD PRESSURE: 124 MMHG | BODY MASS INDEX: 27.64 KG/M2 | HEIGHT: 63 IN | RESPIRATION RATE: 17 BRPM | HEART RATE: 78 BPM | WEIGHT: 156 LBS

## 2025-01-17 DIAGNOSIS — M25.512 ACUTE PAIN OF LEFT SHOULDER: ICD-10-CM

## 2025-01-17 DIAGNOSIS — H61.23 BILATERAL IMPACTED CERUMEN: Primary | ICD-10-CM

## 2025-01-17 DIAGNOSIS — M25.512 ACUTE PAIN OF LEFT SHOULDER: Primary | ICD-10-CM

## 2025-01-17 PROCEDURE — 99214 OFFICE O/P EST MOD 30 MIN: CPT | Performed by: INTERNAL MEDICINE

## 2025-01-17 PROCEDURE — 73030 X-RAY EXAM OF SHOULDER: CPT | Performed by: INTERNAL MEDICINE

## 2025-01-17 PROCEDURE — 99202 OFFICE O/P NEW SF 15 MIN: CPT | Performed by: OTOLARYNGOLOGY

## 2025-01-17 RX ORDER — METHYLPREDNISOLONE 4 MG/1
TABLET ORAL
Qty: 1 EACH | Refills: 0 | Status: SHIPPED | OUTPATIENT
Start: 2025-01-17

## 2025-01-17 RX ORDER — CYCLOBENZAPRINE HCL 5 MG
5 TABLET ORAL NIGHTLY PRN
Qty: 30 TABLET | Refills: 0 | Status: SHIPPED | OUTPATIENT
Start: 2025-01-17

## 2025-01-17 NOTE — PROGRESS NOTES
Subjective:   Patient ID: Chiquita Trevizo is a 69 year old female.    HPI  Patient comes in today with complaint of left shoulder pain's been hurting for almost a week now woke up with this no injury no falls she says she has had similar pain on the right shoulder before.  Has been taking over-the-counter Advil and Tylenol which helped but does not last    History/Other:   Review of Systems   Constitutional: Negative.    HENT: Negative.     Eyes: Negative.    Respiratory: Negative.     Cardiovascular: Negative.    Gastrointestinal: Negative.    Genitourinary: Negative.    Musculoskeletal:  Positive for arthralgias.        Left shoulder pain   Skin: Negative.    Neurological: Negative.    Psychiatric/Behavioral: Negative.       Current Outpatient Medications   Medication Sig Dispense Refill    methylPREDNISolone (MEDROL) 4 MG Oral Tablet Therapy Pack As directed. 1 each 0    cyclobenzaprine 5 MG Oral Tab Take 1 tablet (5 mg total) by mouth nightly as needed. 30 tablet 0    levothyroxine 100 MCG Oral Tab Take 1 tablet (100 mcg total) by mouth before breakfast. 90 tablet 3    simvastatin 10 MG Oral Tab Take 1 tablet (10 mg total) by mouth nightly. 90 tablet 1    aspirin 81 MG Oral Tab EC Take 1 tablet (81 mg total) by mouth daily.      Cholecalciferol (VITAMIN D) 25 MCG (1000 UT) Oral Tab Take by mouth daily.       Allergies:Allergies[1]    Objective:   Physical Exam  Vitals and nursing note reviewed.   Constitutional:       Appearance: She is well-developed.   HENT:      Head: Normocephalic and atraumatic.      Right Ear: External ear normal.      Left Ear: External ear normal.      Nose: Nose normal.   Eyes:      Conjunctiva/sclera: Conjunctivae normal.      Pupils: Pupils are equal, round, and reactive to light.   Cardiovascular:      Rate and Rhythm: Normal rate and regular rhythm.      Heart sounds: Normal heart sounds.   Pulmonary:      Effort: Pulmonary effort is normal.      Breath sounds: Normal breath sounds.    Abdominal:      General: Bowel sounds are normal.      Palpations: Abdomen is soft.   Genitourinary:     Vagina: Normal.   Musculoskeletal:         General: Tenderness present. Normal range of motion.      Cervical back: Normal range of motion and neck supple.      Comments: Left shoulder pain    Skin:     General: Skin is warm and dry.   Neurological:      Mental Status: She is alert and oriented to person, place, and time.      Deep Tendon Reflexes: Reflexes are normal and symmetric.   Psychiatric:         Behavior: Behavior normal.         Thought Content: Thought content normal.         Judgment: Judgment normal.         Assessment & Plan:   1. Acute pain of left shoulder -will treat with steroids muscle relaxant will order an x-ray will refer to physiatry and physical therapy if not better or worse let us know       No orders of the defined types were placed in this encounter.      Meds This Visit:  Requested Prescriptions     Signed Prescriptions Disp Refills    methylPREDNISolone (MEDROL) 4 MG Oral Tablet Therapy Pack 1 each 0     Sig: As directed.    cyclobenzaprine 5 MG Oral Tab 30 tablet 0     Sig: Take 1 tablet (5 mg total) by mouth nightly as needed.       Imaging & Referrals:  PHYSIATRY - INTERNAL  PHYSICAL THERAPY - INTERNAL  XR SHOULDER, COMPLETE (MIN 2 VIEWS), LEFT (CPT=73030)         [1] No Known Allergies

## 2025-01-17 NOTE — PROGRESS NOTES
Chiquita Trevizo is a 69 year old female.    Chief Complaint   Patient presents with    Ear Wax     Patient is here for ear cleaning bilateral       HISTORY OF PRESENT ILLNESS    Patient presents for cerumen removal. No other complaints or concerns at this time    Social History     Socioeconomic History    Marital status:    Tobacco Use    Smoking status: Never     Passive exposure: Never    Smokeless tobacco: Never   Vaping Use    Vaping status: Never Used   Substance and Sexual Activity    Alcohol use: Never    Drug use: Never       History reviewed. No pertinent family history.    History reviewed. No pertinent past medical history.    History reviewed. No pertinent surgical history.    REVIEW OF SYSTEMS    System Neg/Pos Details   Constitutional Negative Fatigue, fever and weight loss.   ENMT Negative Drooling.   Eyes Negative Blurred vision and vision changes.   Respiratory Negative Dyspnea and wheezing.   Cardio Negative Chest pain, irregular heartbeat/palpitations and syncope.   GI Negative Abdominal pain and diarrhea.   Endocrine Negative Cold intolerance and heat intolerance.   Neuro Negative Tremors.   Psych Negative Anxiety and depression.   Integumentary Negative Frequent skin infections, pigment change and rash.   Hema/Lymph Negative Easy bleeding and easy bruising.           PHYSICAL EXAM    There were no vitals taken for this visit.       Constitutional Normal Overall appearance - Normal.        Neck Exam Normal Inspection - Normal. Palpation - Normal. Parotid gland - Normal. Thyroid gland - Normal.             Head/Face Normal Facial features - Normal. Eyebrows - Normal. Skull - Normal.             Ears Normal Inspection - Right: Normal, Left: Normal. Canal - Right: Normal, Left: Normal. TM - Right: Normal, Left: Normal.   Skin Normal Inspection - Normal.                              Canals:  Right: Canal reveals cerumen impaction,   Left: Canal reveals cerumen impaction,     Tympanic  Membranes:  Right: Normal tympanic membrane.   Left: Normal tympanic membrane.     TM Visualized Method:   Right TM examined via otomicroscopy.    Left TM examined via otomicroscopy.      PROCEDURE:    Removal of cerumen impaction   The cerumen impaction was completely removed using microscopy.   Removal was completed by using acurette and/or suction.   Comments: Return to clinic as needed.  Avoid q-tips, water precautions and use over the counter wax remedies as needed.      Current Outpatient Medications:     methylPREDNISolone (MEDROL) 4 MG Oral Tablet Therapy Pack, As directed., Disp: 1 each, Rfl: 0    cyclobenzaprine 5 MG Oral Tab, Take 1 tablet (5 mg total) by mouth nightly as needed., Disp: 30 tablet, Rfl: 0    levothyroxine 100 MCG Oral Tab, Take 1 tablet (100 mcg total) by mouth before breakfast., Disp: 90 tablet, Rfl: 3    simvastatin 10 MG Oral Tab, Take 1 tablet (10 mg total) by mouth nightly., Disp: 90 tablet, Rfl: 1    aspirin 81 MG Oral Tab EC, Take 1 tablet (81 mg total) by mouth daily., Disp: , Rfl:     Cholecalciferol (VITAMIN D) 25 MCG (1000 UT) Oral Tab, Take by mouth daily., Disp: , Rfl:   ASSESSMENT AND PLAN    1. Bilateral impacted cerumen        All cerumen was removed using microscopy. I have asked the patient to return to see me as needed for repeat cerumen removal in the future.      Antony Holbrook MD    1/17/2025    1:16 PM

## 2025-01-18 ENCOUNTER — LAB ENCOUNTER (OUTPATIENT)
Dept: LAB | Facility: HOSPITAL | Age: 70
End: 2025-01-18
Attending: INTERNAL MEDICINE
Payer: MEDICARE

## 2025-01-18 DIAGNOSIS — Z53.20 COLONOSCOPY REFUSED: ICD-10-CM

## 2025-01-18 LAB — HEMOCCULT STL QL: NEGATIVE

## 2025-01-18 PROCEDURE — 82274 ASSAY TEST FOR BLOOD FECAL: CPT

## 2025-01-21 ENCOUNTER — OFFICE VISIT (OUTPATIENT)
Dept: PHYSICAL THERAPY | Facility: HOSPITAL | Age: 70
End: 2025-01-21
Attending: INTERNAL MEDICINE
Payer: MEDICARE

## 2025-01-21 DIAGNOSIS — M25.512 ACUTE PAIN OF LEFT SHOULDER: Primary | ICD-10-CM

## 2025-01-21 PROCEDURE — 97110 THERAPEUTIC EXERCISES: CPT

## 2025-01-21 PROCEDURE — 97161 PT EVAL LOW COMPLEX 20 MIN: CPT

## 2025-01-21 NOTE — PROGRESS NOTES
UPPER EXTREMITY EVALUATION:     Diagnosis:   Acute pain of left shoulder (M25.512) Patient:  Chiquita Trevizo (69 year old, female)        Referring Provider: Rod Moreno  Today's Date   1/21/2025    Precautions:  None   Date of Evaluation: 01/21/25  Next MD visit: NA  Date of Surgery: NA     PATIENT SUMMARY   Summary of chief complaints: L shoulder pain  History of current condition: Pain started recently. She had her injection for osteoporosis in Nov, in Dec she started having arm pain but didn't think much of it. Pain got worse and she started having difficulty lifting the arm. Took pain medication, not much improvement so she went to the MD and was given and x-ray and order for therapy. Pain has been better since the new medication. Has a history of R arm problems, had massage and therapy which helped.   Pain level: current 6 /10, at best 3 /10, at worst 6 /10  Description of symptoms: L shoulder pain on the top of the shoulder, in the front and the back. Occasional pain to the elbow and forearm initially but not currently. Denies numbness/tingling   Occupation: NA    Prior level of function: independent  Current limitations: lifting, reaching, washing her hair, sleeping  Pt goals: to reduce pain  Hand Dominance: right   Past medical history was reviewed with Chiquita.  Significant findings include: NA  Imaging/Tests: X-ray: slight-mod degenerative changes, most significant at [facility]   Chiquita  has no past medical history on file.  She  has no past surgical history on file.    ASSESSMENT  Chiquita presents to physical therapy evaluation with primary c/o L shoulder pain. The results of the objective tests and measures show posture abnormality, decreased shoulder ROM, and LUE weakness. Functional deficits include but are not limited to lifting, reaching, washing her hair, sleeping. Signs and symptoms are consistent with diagnosis of Acute pain of left shoulder (M25.512). Pt and PT discussed evaluation findings, pathology, POC and  HEP.  Pt voiced understanding and performs HEP correctly without reported pain. Skilled Physical Therapy is medically necessary to address the above impairments and reach functional goals.  OBJECTIVE:   Musculoskeletal  Observation/Posture: forward head posture; increased thoracic kyphosis; scapula anterior tilt; shoulder internal rotation  Palpation: not TTP   Accessory motion: GH AP, Inf mobility R normal L normal, AC AP, Inf mobility R hypomobile L hypomobile and painful    Special Tests:     Subacromial Pain Syndrome:  Empty Can test: R -, L -  Painful Arc Sign: R -, L -  External Rotation Resistance: R -, L -  Huertas-Rinku Impingement Sign: R -, L +  Palpable Tenderness Infraspinatus and Supraspinatus: R -, L -     PENELOPE ROM WNL and Strength (5/5) except below:  (* denotes performed with pain)  Shoulder   ROM MMT (-/5)    R L R L     Flex 159 156 (PROM 175) 5 4+*     Ext             Abd (C5) 169 167 (PROM 178) 5 5*     IR T6 T8 (PROM 82) 5 5     ER T4 T3* (PROM 75) 5 3+     Low Trap n/a 3 3-     Mid Trap n/a 3 3     ,   Elbow   ROM MMT (-/5)    R L R L     Flex (C6) WFL WFL 5 5     Ext (C7) WFL WFL 5 5       Today's Treatment and Response:   Pt education was provided on exam findings, treatment diagnosis, treatment plan, expectations, and prognosis.  Today's Treatment       1/21/2025   Treatment   Therapeutic Exercise Shoulder IR green tb x10  Shoulder ER green tb x10   Therapeutic Exercise Min 10   Evaluation Min 35   Total of Timed Procedures 10   Total of Service Based 35   Total Treatment Time 45         Patient was instructed in and issued a HEP for: Shoulder IR green tb  Shoulder ER green tb    Charges:  PT EVAL: Low Complexity, Eval x1 TEx1  In agreement with evaluation findings and clinical rationale, this evaluation involved LOW COMPLEXITY decision making due to no personal factors/comorbidities, 1-2 body structures involved/activity limitations, and stable symptoms as documented in the evaluation.                                                           PLAN OF CARE:    Goals: (to be met in 10 visits)   Goals       Therapy Goals      Not Met Progress Toward Partially Met Met   Pt will report improved ability to sleep without waking due to shoulder pain. [] [] [] []   Pt will increase shoulder AROM ER to T4 to be able to reach and fasten seatbelt. [] [] [] []   Pt will increase shoulder AROM IR to T6 to be able to reach in back pocket, tuck in shirt, and turn steering wheel without pain. [] [] [] []   Pt will improve shoulder strength throughout to 5/5 to improve function with reaching and lifting.  [] [] [] []   Pt will demonstrate increased mid/low trap strength to 3+/5 to promote improved shoulder mechanics and stabilization with lifting and reaching. [] [] [] []   Pt will be independent and compliant with comprehensive HEP to maintain progress achieved in PT. [] [] [] []                   Frequency / Duration: Patient will be seen 2x/week or a total of 10  visits over a 90 day period. Treatment will include: Manual Therapy; Neuromuscular Re-education; Therapeutic Activities; Therapeutic Exercise; Home Exercise Program instruction    Education or treatment limitation: None   Rehab Potential: good     QuickDASH Outcome Score  Score: (Patient-Rptd) 4.55 % (1/21/2025  9:15 AM)      Patient/Family/Caregiver was advised of these findings, precautions, and treatment options and has agreed to actively participate in planning and for this course of care.    Thank you for your referral. Please co-sign or sign and return this letter via fax as soon as possible to [phone]. If you have any questions, please contact me at Dept: [phone]    Sincerely,  Electronically signed by therapist: Linda Mason PT  Physician's certification required: Yes  I certify the need for these services furnished under this plan of treatment and while under my care.    X___________________________________________________  Date____________________    Certification From: 1/21/2025  To:4/21/2025

## 2025-01-22 ENCOUNTER — OFFICE VISIT (OUTPATIENT)
Dept: PHYSICAL MEDICINE AND REHAB | Facility: CLINIC | Age: 70
End: 2025-01-22
Payer: MEDICAID

## 2025-01-22 VITALS — BODY MASS INDEX: 28 KG/M2 | WEIGHT: 156 LBS

## 2025-01-22 DIAGNOSIS — M19.012 ARTHRITIS OF LEFT ACROMIOCLAVICULAR JOINT: ICD-10-CM

## 2025-01-22 DIAGNOSIS — G89.29 CHRONIC LEFT SHOULDER PAIN: Primary | ICD-10-CM

## 2025-01-22 DIAGNOSIS — M25.512 CHRONIC LEFT SHOULDER PAIN: Primary | ICD-10-CM

## 2025-01-22 DIAGNOSIS — M19.012 OSTEOARTHRITIS OF LEFT GLENOHUMERAL JOINT: ICD-10-CM

## 2025-01-22 DIAGNOSIS — M67.919 TENDINOPATHY OF ROTATOR CUFF, UNSPECIFIED LATERALITY: ICD-10-CM

## 2025-01-22 PROCEDURE — 99204 OFFICE O/P NEW MOD 45 MIN: CPT | Performed by: PHYSICAL MEDICINE & REHABILITATION

## 2025-01-22 NOTE — H&P
[facility]  Clinic H&P    Requesting Physician: No primary care provider on file.    Chief Complaint (Reason for Visit):    Chief Complaint   Patient presents with    New Patient     New right handed patient here for Left shoulder pain.Pain started 1 week ago denies any injury . Pain 0/10.No T/N .Prior prolia injection on 12/27/24. Xray shoulder 1/17/25. Cyclobenzaprine and medrol pack .Evaluation for Pt 1/21/25.        History of Present Illness:  The patient is a 69 year old right-handed female with a significant past medical history of thyroid disease and hyperlipidemia who presents with left shoulder pain has been ongoing for the last couple of months without inciting event.  She rates her discomfort currently a 0 out of 10 but can be as high as a 3-4 out of 10.  Her symptoms are aggravated by reaching behind her such as to put on a seatbelt with the left arm.  Her symptoms improve with rest.  She has almost completed a Medrol Dosepak with improvement.  She has had x-rays of the left shoulder.  No injections.  She was seen for physical therapy and has completed 1 session thus far with Linda.  I have reviewed those notes.  PAST MEDICAL HISTORY:   History reviewed. No pertinent past medical history.    PAST SURGICAL HISTORY:   History reviewed. No pertinent surgical history.     FAMILY HISTORY:   History reviewed. No pertinent family history.    SOCIAL HISTORY:   Social History     Occupational History    Not on file   Tobacco Use    Smoking status: Never     Passive exposure: Never    Smokeless tobacco: Never   Vaping Use    Vaping status: Never Used   Substance and Sexual Activity    Alcohol use: Never    Drug use: Never    Sexual activity: Not on file       CURRENT MEDICATIONS:   Current Outpatient Medications   Medication Sig Dispense Refill    methylPREDNISolone (MEDROL) 4 MG Oral Tablet Therapy Pack As directed. 1 each 0    cyclobenzaprine 5 MG Oral Tab Take  1 tablet (5 mg total) by mouth nightly as needed. 30 tablet 0    levothyroxine 100 MCG Oral Tab Take 1 tablet (100 mcg total) by mouth before breakfast. 90 tablet 3    simvastatin 10 MG Oral Tab Take 1 tablet (10 mg total) by mouth nightly. 90 tablet 1    aspirin 81 MG Oral Tab EC Take 1 tablet (81 mg total) by mouth daily.      Cholecalciferol (VITAMIN D) 25 MCG (1000 UT) Oral Tab Take by mouth daily.          ALLERGIES:   Allergies[1]      REVIEW OF SYSTEMS:   Review of Systems   Constitutional: Negative.    HENT: Negative.    Eyes: Negative.    Respiratory: Negative.    Cardiovascular: Negative.    Gastrointestinal: Negative.    Genitourinary: Negative.    Musculoskeletal: As per HPI   Skin: Negative.    Neurological: As per HPI  Endo/Heme/Allergies: Negative.    Psychiatric/Behavioral: Negative.      All other systems reviewed and are negative. Pertinent positives and negatives noted in the HPI.        PHYSICAL EXAM:   Wt 156 lb (70.8 kg)   BMI 27.63 kg/m²     Body mass index is 27.63 kg/m².      General: No immediate distress  Head: Normocephalic/ Atraumatic  Eyes: Extra-occular movements intact.   Ears: No auricular hematoma or deformities  Mouth: No lesions or ulcerations  Heart: peripheral pulses intact. Normal capillary refill.   Lungs: Non-labored respirations  Abdomen: No abdominal guarding  Extremities: No lower extremity edema bilaterally   Skin: No lesions noted.   Cognition: alert & oriented x 3, attentive, able to follow 2 step commands, comprehention intact, spontaneous speech intact  Motor:    Musculoskeletal:    SHOULDER:  Inspection: no erythema, swelling, or obvious deformity.  No AC joint deformity  Palpation: no ttp over clavical, AC joint, or scapular spine. no  ROM: Full active range of motion of the left shoulder with crepitus  Strength: 5 out of 5 in all myotomes of the upper extremities  Sensation: Intact to light touch in all dermatomes of the upper extremities  Reflexes: 2/4 at C5, C6,  C7  Neer's test: Negative  Hawkin's test: Negative  Cross Arm Test: negative for AC joint pain  Speed's Test: Positive on the left for pain and weakness  Yergason Test: negative for biceps tendon pain  Empty Can Test: Positive on the left for pain and weakness      Gait:  Normal    Data  [facility] Lab Encounter on 01/18/2025   Component Date Value Ref Range Status    Occult Blood 01/18/2025 Negative  Negative Final   [facility] Lab Encounter on 12/02/2024   Component Date Value Ref Range Status    WBC 12/02/2024 7.9  4.0 - 11.0 x10(3) uL Final    RBC 12/02/2024 4.55  3.80 - 5.30 x10(6)uL Final    HGB 12/02/2024 13.8  12.0 - 16.0 g/dL Final    HCT 12/02/2024 40.9  35.0 - 48.0 % Final    MCV 12/02/2024 89.9  80.0 - 100.0 fL Final    MCH 12/02/2024 30.3  26.0 - 34.0 pg Final    MCHC 12/02/2024 33.7  31.0 - 37.0 g/dL Final    RDW-SD 12/02/2024 42.9  35.1 - 46.3 fL Final    RDW 12/02/2024 13.2  11.0 - 15.0 % Final    PLT 12/02/2024 255.0  150.0 - 450.0 10(3)uL Final    Neutrophil Absolute Prelim 12/02/2024 3.47  1.50 - 7.70 x10 (3) uL Final    Neutrophil Absolute 12/02/2024 3.47  1.50 - 7.70 x10(3) uL Final    Lymphocyte Absolute 12/02/2024 3.33  1.00 - 4.00 x10(3) uL Final    Monocyte Absolute 12/02/2024 0.68  0.10 - 1.00 x10(3) uL Final    Eosinophil Absolute 12/02/2024 0.30  0.00 - 0.70 x10(3) uL Final    Basophil Absolute 12/02/2024 0.09  0.00 - 0.20 x10(3) uL Final    Immature Granulocyte Absolute 12/02/2024 0.04  0.00 - 1.00 x10(3) uL Final    Neutrophil % 12/02/2024 43.9  % Final    Lymphocyte % 12/02/2024 42.1  % Final    Monocyte % 12/02/2024 8.6  % Final    Eosinophil % 12/02/2024 3.8  % Final    Basophil % 12/02/2024 1.1  % Final    Immature Granulocyte % 12/02/2024 0.5  % Final    Glucose 12/02/2024 115 (H)  70 - 99 mg/dL Final    Sodium 12/02/2024 141  136 - 145 mmol/L Final    Potassium 12/02/2024 3.9  3.5 - 5.1 mmol/L Final    Chloride 12/02/2024 108  98 - 112 mmol/L Final    CO2 12/02/2024 29.0  21.0 - 32.0 mmol/L  Final    Anion Gap 12/02/2024 4  0 - 18 mmol/L Final    BUN 12/02/2024 14  9 - 23 mg/dL Final    Creatinine 12/02/2024 0.96  0.55 - 1.02 mg/dL Final    BUN/CREA Ratio 12/02/2024 14.6  10.0 - 20.0 Final    Calcium, Total 12/02/2024 10.7 (H)  8.7 - 10.4 mg/dL Final    Calculated Osmolality 12/02/2024 293  275 - 295 mOsm/kg Final    eGFR-Cr 12/02/2024 64  >=60 mL/min/1.73m2 Final    ALT 12/02/2024 13  10 - 49 U/L Final    AST 12/02/2024 17  <34 U/L Final    Alkaline Phosphatase 12/02/2024 73  55 - 142 U/L Final    Bilirubin, Total 12/02/2024 0.7  0.2 - 1.1 mg/dL Final    Total Protein 12/02/2024 7.5  5.7 - 8.2 g/dL Final    Albumin 12/02/2024 4.7  3.2 - 4.8 g/dL Final    Globulin  12/02/2024 2.8  2.0 - 3.5 g/dL Final    A/G Ratio 12/02/2024 1.7  1.0 - 2.0 Final    Patient Fasting for CMP? 12/02/2024 Yes   Final    Cholesterol, Total 12/02/2024 215 (H)  <200 mg/dL Final    HDL Cholesterol 12/02/2024 52  40 - 59 mg/dL Final    Triglycerides 12/02/2024 166 (H)  30 - 149 mg/dL Final    LDL Cholesterol 12/02/2024 133 (H)  <100 mg/dL Final    VLDL 12/02/2024 30  0 - 30 mg/dL Final    Non HDL Chol 12/02/2024 163 (H)  <130 mg/dL Final    Patient Fasting for Lipid? 12/02/2024 Yes   Final    TSH 12/02/2024 1.336  0.550 - 4.780 uIU/mL Final    Urine Color 12/02/2024 Light-Yellow  Yellow Final    Clarity Urine 12/02/2024 Clear  Clear Final    Spec Gravity 12/02/2024 1.013  1.005 - 1.030 Final    Glucose Urine 12/02/2024 Normal  Normal mg/dL Final    Bilirubin Urine 12/02/2024 Negative  Negative Final    Ketones Urine 12/02/2024 Negative  Negative mg/dL Final    Blood Urine 12/02/2024 Negative  Negative Final    pH Urine 12/02/2024 6.0  5.0 - 8.0 Final    Protein Urine 12/02/2024 Negative  Negative mg/dL Final    Urobilinogen Urine 12/02/2024 Normal  Normal Final    Nitrite Urine 12/02/2024 Negative  Negative Final    Leukocyte Esterase Urine 12/02/2024 Negative  Negative Final    Microscopic 12/02/2024 Microscopic not indicated    Final    HgbA1C 12/02/2024 6.4 (H)  <5.7 % Final    Estimated Average Glucose 12/02/2024 137 (H)  68 - 126 mg/dL Final   [facility] Lab Encounter on 12/02/2024   Component Date Value Ref Range Status    Ventricular rate 12/02/2024 64  BPM Final    Atrial rate 12/02/2024 64  BPM Final    P-R Interval 12/02/2024 182  ms Final    QRS Duration 12/02/2024 78  ms Final    Q-T Interval 12/02/2024 402  ms Final    QTC Calculation (Bezet) 12/02/2024 414  ms Final    P Axis 12/02/2024 58  degrees Final    R Axis 12/02/2024 -65  degrees Final    T Axis 12/02/2024 56  degrees Final   ]      Radiology Imaging:  I reviewed with the patient her X-ray of the shoulder left   XR SHOULDER, COMPLETE (MIN 2 VIEWS), LEFT (CPT=73030)  Narrative: PROCEDURE: XR SHOULDER, COMPLETE (MIN 2 VIEWS), LEFT (CPT=73030)     COMPARISON: None.     INDICATIONS: Acute pain of left shoulder,for a few weeks no known injury.     TECHNIQUE: Bone mineralization is slightly diminished.  views were obtained.       FINDINGS:         There is no acute fracture/dislocation.     There are slight to moderate degenerative changes within the left shoulder manifested by bony hypertrophy, articular space narrowing, and subarticular sclerosis.  These are most significant at the acromioclavicular articulation.                 Impression: CONCLUSION: Slight to moderate degenerative changes within the left shoulder.  These are most significant at the acromioclavicular articulation.           Dictated by (CST): Keanu James MD on 1/20/2025 at 9:22 AM       Finalized by (CST): Keanu James MD on 1/20/2025 at 9:28 AM              ASSESSMENT AND PLAN:  The patient is a pleasant 69-year-old female who presents with left-sided shoulder pain which I believe is due to rotator cuff tendinopathy with impingement syndrome and glenohumeral osteoarthritis.  I have reviewed her x-rays of the left shoulder.  She does have mild pain and weakness with supraspinatus and biceps tendon  testing.  I am recommending she continue working with physical therapy, continue and finish her Medrol Dosepak, and use Tylenol for pain.  She will follow-up with me in about 2 to 3 months.  If her symptoms return, then I would recommend a left subacromial injection.       RTC in 2 to 3 months    Discharge Instructions were provided as documented in AVS summary.  The patient was in agreement with the assessment and plan.  All questions were answered.  There were no barriers to learning.         1. Chronic left shoulder pain    2. Tendinopathy of rotator cuff, unspecified laterality    3. Arthritis of left acromioclavicular joint    4. Osteoarthritis of left glenohumeral joint        Alex B. Behar MD, [facility]  Physical Medicine and Rehabilitation/Sports Medicine  [facility]      [employer] Cures Act Notice to Patient: Medical documents like this are made available to patients in the interest of transparency. However, be advised this is a medical document and it is intended as rnsr-ro-luoa communication between your medical providers. This medical document may contain abbreviations, assessments, medical data, and results or other terms that are unfamiliar. Medical documents are intended to carry relevant information, facts as evident, and the clinical opinion of the practitioner. As such, this medical document may be written in language that appears blunt or direct. You are encouraged to contact your medical provider and/or [facility] if you have any questions about this medical document.          [1] No Known Allergies

## 2025-01-22 NOTE — PATIENT INSTRUCTIONS
1) Finish the medrol dose pack oral steroids  2) Use the muscle relaxes if needed  3) Continue with physical therapy working with Linda  4) Tylenol 500-1000 mg every 6-8 hours as needed for pain.  No more than 3000 mg daily.  5) Follow-up with me in 6 to 8 weeks after you  begin physical therapy. If symptoms do not improve, then I would recommend a left subacromial CSI

## 2025-01-27 ENCOUNTER — OFFICE VISIT (OUTPATIENT)
Dept: PHYSICAL THERAPY | Facility: HOSPITAL | Age: 70
End: 2025-01-27
Attending: INTERNAL MEDICINE
Payer: MEDICARE

## 2025-01-27 PROCEDURE — 97140 MANUAL THERAPY 1/> REGIONS: CPT

## 2025-01-27 PROCEDURE — 97110 THERAPEUTIC EXERCISES: CPT

## 2025-01-27 NOTE — PROGRESS NOTES
Patient: Chiquita Trevizo (69 year old, female) Referring Provider:  Insurance:   Diagnosis: Acute pain of left shoulder (M25.512) Rod Moreno  MEDICARE   Date of Surgery: NA Next MD visit:  MEDICAID   Precautions:  None NA Referral Information:    Date of Evaluation: Req: 0, Auth: 0, Exp:     01/21/25 POC Auth Visits:  10       Today's Date   1/27/2025    Subjective  The pt states the shoulder is feeling better. Denies feeling sore after the evaluation but is still having pain.       Pain: 9/10     Objective  Kanu interpretation provided throughout session via language line  From IE:  Observation/Posture: forward head posture; increased thoracic kyphosis; scapula anterior tilt; shoulder internal rotation  Palpation: not TTP          Accessory motion: GH AP, Inf mobility R normal L normal, AC AP, Inf mobility R hypomobile L hypomobile and painful     Special Tests:      Subacromial Pain Syndrome:  Empty Can test: R -, L -  Painful Arc Sign: R -, L -  External Rotation Resistance: R -, L -  Huertas-Rinku Impingement Sign: R -, L +  Palpable Tenderness Infraspinatus and Supraspinatus: R -, L -                 PENELOPE ROM WNL and Strength (5/5) except below:  (* denotes performed with pain)         Shoulder    ROM MMT (-/5)    R L R L     Flex 159 156 (PROM 175) 5 4+*     Ext         Abd (C5) 169 167 (PROM 178) 5 5*     IR T6 T8 (PROM 82) 5 5     ER T4 T3* (PROM 75) 5 3+     Low Trap n/a 3 3-     Mid Trap n/a 3 3      ,          Elbow    ROM MMT (-/5)    R L R L     Flex (C6) WFL WFL 5 5     Ext (C7) WFL WFL 5 5        Assessment  AC joint continues to be painful with mobilization and is contributing to impingement symptoms. She needs consistent cues for improved motor control with RTC strenghtening exercises.    Goals (to be met in 10 visits)   Goals       Therapy Goals      Not Met Progress Toward Partially Met Met   Pt will report improved ability to sleep without waking due to shoulder pain. [] [] [] []   Pt will  increase shoulder AROM ER to T4 to be able to reach and fasten seatbelt. [] [] [] []   Pt will increase shoulder AROM IR to T6 to be able to reach in back pocket, tuck in shirt, and turn steering wheel without pain. [] [] [] []   Pt will improve shoulder strength throughout to 5/5 to improve function with reaching and lifting.  [] [] [] []   Pt will demonstrate increased mid/low trap strength to 3+/5 to promote improved shoulder mechanics and stabilization with lifting and reaching. [] [] [] []   Pt will be independent and compliant with comprehensive HEP to maintain progress achieved in PT. [] [] [] []                   Plan  Cont per POC    Treatment Last 4 Visits       1/21/2025 1/27/2025   Treatment   Treatment Day  2   Therapeutic Exercise Shoulder IR green tb x10  Shoulder ER green tb x10 Pulleys 3 min ea flex and abd  Shoulder abd in sidelying 2 lbs x30  Shoulder ER in sidelying 2 lbs x30  Shoulder IR green tb  Shoulder ER green tb  Wall slides x30   Manual Therapy  Gr III- L AC AP and Inf mobilization  PROM L shoulder Flex, Abd, IR, ER   Therapeutic Exercise Min 10 25   Manual Therapy Min  18   Evaluation Min 35    Total of Timed Procedures 10 43   Total of Service Based 35 0   Total Treatment Time 45 43         HEP  Shoulder IR green tb  Shoulder ER green tb    Charges     MTx1 TEx2

## 2025-01-30 ENCOUNTER — APPOINTMENT (OUTPATIENT)
Dept: PHYSICAL THERAPY | Facility: HOSPITAL | Age: 70
End: 2025-01-30
Attending: INTERNAL MEDICINE
Payer: MEDICARE

## 2025-02-05 ENCOUNTER — OFFICE VISIT (OUTPATIENT)
Dept: PHYSICAL THERAPY | Facility: HOSPITAL | Age: 70
End: 2025-02-05
Attending: INTERNAL MEDICINE
Payer: MEDICARE

## 2025-02-05 PROCEDURE — 97112 NEUROMUSCULAR REEDUCATION: CPT

## 2025-02-05 PROCEDURE — 97140 MANUAL THERAPY 1/> REGIONS: CPT

## 2025-02-05 PROCEDURE — 97110 THERAPEUTIC EXERCISES: CPT

## 2025-02-05 NOTE — PROGRESS NOTES
Patient: Chiquita Trevizo (69 year old, female) Referring Provider:  Insurance:   Diagnosis: Acute pain of left shoulder (M25.512) Rod Moreno  MEDICARE   Date of Surgery: NA Next MD visit:  MEDICAID   Precautions:  None NA Referral Information:    Date of Evaluation: Req: 0, Auth: 0, Exp:     01/21/25 POC Auth Visits:  10       Today's Date   2/5/2025    Subjective  The pt states she is feeling good today, less pain. States she does her HEP 2 times a day. Very little pain today.       Pain: 1/10     Objective  From IE:    Observation/Posture: forward head posture; increased thoracic kyphosis; scapula anterior tilt; shoulder internal rotation  Palpation: not TTP          Accessory motion: GH AP, Inf mobility R normal L normal, AC AP, Inf mobility R hypomobile L hypomobile and painful     Special Tests:      Subacromial Pain Syndrome:  Empty Can test: R -, L -  Painful Arc Sign: R -, L -  External Rotation Resistance: R -, L -  Huertas-Rinku Impingement Sign: R -, L +  Palpable Tenderness Infraspinatus and Supraspinatus: R -, L -                 PENELOPE ROM WNL and Strength (5/5) except below:  (* denotes performed with pain)         Shoulder    ROM MMT (-/5)    R L R L     Flex 159 156 (PROM 175) 5 4+*     Ext         Abd (C5) 169 167 (PROM 178) 5 5*     IR T6 T8 (PROM 82) 5 5     ER T4 T3* (PROM 75) 5 3+     Low Trap n/a 3 3-     Mid Trap n/a 3 3      ,          Elbow    ROM MMT (-/5)    R L R L     Flex (C6) WFL WFL 5 5     Ext (C7) WFL WFL 5 5       Interpretation provided by language line throughout session     Assessment  AC joint was less irritable today but not entirely pain free. She continues to have some clunking and crepitus at end ranges with PROM but overall ranges are improving. She has excellent carryover of pain relief between sessions.    Goals (to be met in 10 visits)   Goals       Therapy Goals      Not Met Progress Toward Partially Met Met   Pt will report improved ability to sleep without waking due to  shoulder pain. [] [] [] []   Pt will increase shoulder AROM ER to T4 to be able to reach and fasten seatbelt. [] [] [] []   Pt will increase shoulder AROM IR to T6 to be able to reach in back pocket, tuck in shirt, and turn steering wheel without pain. [] [] [] []   Pt will improve shoulder strength throughout to 5/5 to improve function with reaching and lifting.  [] [] [] []   Pt will demonstrate increased mid/low trap strength to 3+/5 to promote improved shoulder mechanics and stabilization with lifting and reaching. [] [] [] []   Pt will be independent and compliant with comprehensive HEP to maintain progress achieved in PT. [] [] [] []                   Plan  Cont per POC    Treatment Last 4 Visits       1/21/2025 1/27/2025 2/5/2025   PT Treatment   Treatment Day  2 3   Therapeutic Exercise Shoulder IR green tb x10  Shoulder ER green tb x10 Pulleys 3 min ea flex and abd  Shoulder abd in sidelying 2 lbs x30  Shoulder ER in sidelying 2 lbs x30  Shoulder IR green tb  Shoulder ER green tb  Wall slides x30 Pulleys 3 min ea flex and abd   Shoulder abd in sidelying 2 lbs x30   Shoulder ER in sidelying 2 lbs x30      Neuro Re-Ed   Rows at 60 deg abd green tb x20 B  3 way wall reach yellow tb x15 R and L  Wall slides x30      Manual Therapy  Gr III- L AC AP and Inf mobilization  PROM L shoulder Flex, Abd, IR, ER Gr III- L AC AP and Inf mobilization   PROM L shoulder Flex, Abd, IR, ER      Therapeutic Exercise Min 10 25 12   Neuro Re-Ed Min   12   Manual Therapy Min  18 18   Evaluation Min 35     Total of Timed Procedures 10 43 42   Total of Service Based 35 0 0   Total Treatment Time 45 43 42         HEP  Shoulder IR green tb  Shoulder ER green tb    Charges     MTx1 TEx1 NMRx1

## 2025-02-12 ENCOUNTER — OFFICE VISIT (OUTPATIENT)
Dept: PHYSICAL THERAPY | Facility: HOSPITAL | Age: 70
End: 2025-02-12
Attending: INTERNAL MEDICINE
Payer: MEDICARE

## 2025-02-12 PROCEDURE — 97110 THERAPEUTIC EXERCISES: CPT

## 2025-02-12 PROCEDURE — 97112 NEUROMUSCULAR REEDUCATION: CPT

## 2025-02-12 PROCEDURE — 97140 MANUAL THERAPY 1/> REGIONS: CPT

## 2025-02-12 NOTE — PROGRESS NOTES
Patient: Chiquita Trevizo (69 year old, female) Referring Provider:  Insurance:   Diagnosis: Acute pain of left shoulder (M25.512) Rod Moreno  MEDICARE   Date of Surgery: NA Next MD visit:  MEDICAID   Precautions:  None NA Referral Information:    Date of Evaluation: Req: 0, Auth: 0, Exp:     01/21/25 POC Auth Visits:  10       Today's Date   2/12/2025    Subjective  The pt reports she hasn't had any significant pain over the last week, seems a little better.       Pain: 2/10     Objective  Interpretation provided with language line; From IE:    Observation/Posture: forward head posture; increased thoracic kyphosis; scapula anterior tilt; shoulder internal rotation  Palpation: not TTP          Accessory motion: GH AP, Inf mobility R normal L normal, AC AP, Inf mobility R hypomobile L hypomobile and painful     Special Tests:      Subacromial Pain Syndrome:  Empty Can test: R -, L -  Painful Arc Sign: R -, L -  External Rotation Resistance: R -, L -  Huertas-Rinku Impingement Sign: R -, L +  Palpable Tenderness Infraspinatus and Supraspinatus: R -, L -                 PENELOPE ROM WNL and Strength (5/5) except below:  (* denotes performed with pain)         Shoulder    ROM MMT (-/5)    R L R L     Flex 159 156 (PROM 175) 5 4+*     Ext         Abd (C5) 169 167 (PROM 178) 5 5*     IR T6 T8 (PROM 82) 5 5     ER T4 T3* (PROM 75) 5 3+     Low Trap n/a 3 3-     Mid Trap n/a 3 3      ,          Elbow    ROM MMT (-/5)    R L R L     Flex (C6) WFL WFL 5 5     Ext (C7) WFL WFL 5 5          Assessment  PROM is making steady gains and less crepitus and clunking noted today.    Goals (to be met in 10 visits)   Goals       Therapy Goals      Not Met Progress Toward Partially Met Met   Pt will report improved ability to sleep without waking due to shoulder pain. [] [] [] []   Pt will increase shoulder AROM ER to T4 to be able to reach and fasten seatbelt. [] [] [] []   Pt will increase shoulder AROM IR to T6 to be able to reach in back  pocket, tuck in shirt, and turn steering wheel without pain. [] [] [] []   Pt will improve shoulder strength throughout to 5/5 to improve function with reaching and lifting.  [] [] [] []   Pt will demonstrate increased mid/low trap strength to 3+/5 to promote improved shoulder mechanics and stabilization with lifting and reaching. [] [] [] []   Pt will be independent and compliant with comprehensive HEP to maintain progress achieved in PT. [] [] [] []                   Plan  Cont per POC    Treatment Last 4 Visits       1/21/2025 1/27/2025 2/5/2025 2/12/2025   PT Treatment   Treatment Day  2 3 4   Therapeutic Exercise Shoulder IR green tb x10  Shoulder ER green tb x10 Pulleys 3 min ea flex and abd  Shoulder abd in sidelying 2 lbs x30  Shoulder ER in sidelying 2 lbs x30  Shoulder IR green tb  Shoulder ER green tb  Wall slides x30 Pulleys 3 min ea flex and abd   Shoulder abd in sidelying 2 lbs x30   Shoulder ER in sidelying 2 lbs x30    Pulleys 3 min ea flex and abd   Shoulder abd in sidelying 2 lbs x30   Shoulder ER in sidelying 2 lbs x10 Dc'd d/t cramp     Neuro Re-Ed   Rows at 60 deg abd green tb x20 B  3 way wall reach yellow tb x15 R and L  Wall slides x30    Rows at 60 deg abd green tb x20 B   3 way wall reach yellow tb x15 R and L   Wall slides x30      Manual Therapy  Gr III- L AC AP and Inf mobilization  PROM L shoulder Flex, Abd, IR, ER Gr III- L AC AP and Inf mobilization   PROM L shoulder Flex, Abd, IR, ER    Gr III- L AC AP and Inf mobilization   PROM L shoulder Flex, Abd, IR, ER      Therapeutic Exercise Min 10 25 12 12   Neuro Re-Ed Min   12 10   Manual Therapy Min  18 18 18   Evaluation Min 35      Total of Timed Procedures 10 43 42 40   Total of Service Based 35 0 0 0   Total Treatment Time 45 43 42 40         HEP  Shoulder IR green tb  Shoulder ER green tb    Charges     MTx1 TEx1 NMRx1

## 2025-02-13 ENCOUNTER — HOSPITAL ENCOUNTER (OUTPATIENT)
Dept: ULTRASOUND IMAGING | Facility: HOSPITAL | Age: 70
Discharge: HOME | End: 2025-02-13
Attending: INTERNAL MEDICINE
Payer: MEDICARE

## 2025-02-13 DIAGNOSIS — E06.3 HYPOTHYROIDISM DUE TO HASHIMOTO'S THYROIDITIS: ICD-10-CM

## 2025-02-13 PROCEDURE — 76536 US EXAM OF HEAD AND NECK: CPT | Performed by: INTERNAL MEDICINE

## 2025-02-19 ENCOUNTER — OFFICE VISIT (OUTPATIENT)
Dept: PHYSICAL THERAPY | Facility: HOSPITAL | Age: 70
End: 2025-02-19
Attending: INTERNAL MEDICINE
Payer: MEDICARE

## 2025-02-19 ENCOUNTER — TELEPHONE (OUTPATIENT)
Dept: PHYSICAL THERAPY | Facility: HOSPITAL | Age: 70
End: 2025-02-19

## 2025-02-19 PROCEDURE — 97112 NEUROMUSCULAR REEDUCATION: CPT

## 2025-02-19 PROCEDURE — 97110 THERAPEUTIC EXERCISES: CPT

## 2025-02-19 PROCEDURE — 97140 MANUAL THERAPY 1/> REGIONS: CPT

## 2025-02-19 NOTE — PROGRESS NOTES
Patient: Chiquita Trevizo (69 year old, female) Referring Provider:  Insurance:   Diagnosis: Acute pain of left shoulder (M25.512) Rod Moreno  MEDICARE   Date of Surgery: NA Next MD visit:  MEDICAID   Precautions:  None NA Referral Information:    Date of Evaluation: Req: 0, Auth: 0, Exp:     01/21/25 POC Auth Visits:  10       Today's Date   2/19/2025    Subjective  The pt states her shoulder continues to feel better. Is having less pain.       Pain: 1/10     Objective  Interpretation provided with language line; From IE:    Observation/Posture: forward head posture; increased thoracic kyphosis; scapula anterior tilt; shoulder internal rotation  Palpation: not TTP          Accessory motion: GH AP, Inf mobility R normal L normal, AC AP, Inf mobility R hypomobile L hypomobile and painful     Special Tests:      Subacromial Pain Syndrome:  Empty Can test: R -, L -  Painful Arc Sign: R -, L -  External Rotation Resistance: R -, L -  Huertas-Rinku Impingement Sign: R -, L +  Palpable Tenderness Infraspinatus and Supraspinatus: R -, L -                 PENELOPE ROM WNL and Strength (5/5) except below:  (* denotes performed with pain)         Shoulder    ROM MMT (-/5)    R L R L     Flex 159 156 (PROM 175) 5 4+*     Ext         Abd (C5) 169 167 (PROM 178) 5 5*     IR T6 T8 (PROM 82) 5 5     ER T4 T3* (PROM 75) 5 3+     Low Trap n/a 3 3-     Mid Trap n/a 3 3      ,          Elbow    ROM MMT (-/5)    R L R L     Flex (C6) WFL WFL 5 5     Ext (C7) WFL WFL 5 5          Assessment  Resisted ER was better toleread today and she was therefore able to progress with strengthening. Crepitus and clunking with ER persists.    Goals (to be met in 10 visits)   Goals       Therapy Goals      Not Met Progress Toward Partially Met Met   Pt will report improved ability to sleep without waking due to shoulder pain. [] [] [] []   Pt will increase shoulder AROM ER to T4 to be able to reach and fasten seatbelt. [] [] [] []   Pt will increase shoulder  AROM IR to T6 to be able to reach in back pocket, tuck in shirt, and turn steering wheel without pain. [] [] [] []   Pt will improve shoulder strength throughout to 5/5 to improve function with reaching and lifting.  [] [] [] []   Pt will demonstrate increased mid/low trap strength to 3+/5 to promote improved shoulder mechanics and stabilization with lifting and reaching. [] [] [] []   Pt will be independent and compliant with comprehensive HEP to maintain progress achieved in PT. [] [] [] []                   Plan  Cont per POC    Treatment Last 4 Visits       1/27/2025 2/5/2025 2/12/2025 2/19/2025   PT Treatment   Treatment Day 2 3 4 5   Therapeutic Exercise Pulleys 3 min ea flex and abd  Shoulder abd in sidelying 2 lbs x30  Shoulder ER in sidelying 2 lbs x30  Shoulder IR green tb  Shoulder ER green tb  Wall slides x30 Pulleys 3 min ea flex and abd   Shoulder abd in sidelying 2 lbs x30   Shoulder ER in sidelying 2 lbs x30    Pulleys 3 min ea flex and abd   Shoulder abd in sidelying 2 lbs x30   Shoulder ER in sidelying 2 lbs x10 Dc'd d/t cramp   Pulleys 3 min ea flex and abd   Shoulder abd in sidelying 2 lbs x30   Shoulder ER in sidelying 2 lbs x30     Neuro Re-Ed  Rows at 60 deg abd green tb x20 B  3 way wall reach yellow tb x15 R and L  Wall slides x30    Rows at 60 deg abd green tb x20 B   3 way wall reach yellow tb x15 R and L   Wall slides x30    Shoulder HABD green tb x20 B   3 way wall reach yellow tb x15 R and L   Wall slides yellow tb x30   Standing back to wall shoulder flex and scap 1 lbs x20     Manual Therapy Gr III- L AC AP and Inf mobilization  PROM L shoulder Flex, Abd, IR, ER Gr III- L AC AP and Inf mobilization   PROM L shoulder Flex, Abd, IR, ER    Gr III- L AC AP and Inf mobilization   PROM L shoulder Flex, Abd, IR, ER    Gr III- L AC AP and Inf mobilization   PROM L shoulder Flex, Abd, IR, ER      Therapeutic Exercise Min 25 12 12 12   Neuro Re-Ed Min  12 10 12   Manual Therapy Min 18 18 18 18    Total of Timed Procedures 43 42 40 42   Total of Service Based 0 0 0 0   Total Treatment Time 43 42 40 42         HEP  Shoulder IR green tb  Shoulder ER green tb    Charges     MTx1 TEx1 NMRx1

## 2025-02-24 ENCOUNTER — TELEPHONE (OUTPATIENT)
Dept: PHYSICAL THERAPY | Facility: HOSPITAL | Age: 70
End: 2025-02-24

## 2025-02-24 ENCOUNTER — OFFICE VISIT (OUTPATIENT)
Dept: PHYSICAL THERAPY | Facility: HOSPITAL | Age: 70
End: 2025-02-24
Attending: INTERNAL MEDICINE
Payer: MEDICARE

## 2025-02-24 PROCEDURE — 97140 MANUAL THERAPY 1/> REGIONS: CPT

## 2025-02-24 PROCEDURE — 97112 NEUROMUSCULAR REEDUCATION: CPT

## 2025-02-24 PROCEDURE — 97110 THERAPEUTIC EXERCISES: CPT

## 2025-02-24 NOTE — PROGRESS NOTES
Patient: Chiquita Trevizo (69 year old, female) Referring Provider:  Insurance:   Diagnosis: Acute pain of left shoulder (M25.512) Rod Moreno  MEDICARE   Date of Surgery: NA Next MD visit:  MEDICAID   Precautions:  None NA Referral Information:    Date of Evaluation: Req: 0, Auth: 0, Exp:     01/21/25 POC Auth Visits:  10       Today's Date   2/24/2025    Subjective  The pt states she feels better today again. was having some pain yesterday but very little pain today.       Pain: 1/10     Objective  Interpretation provided with language line; From IE:    Observation/Posture: forward head posture; increased thoracic kyphosis; scapula anterior tilt; shoulder internal rotation  Palpation: not TTP          Accessory motion: GH AP, Inf mobility R normal L normal, AC AP, Inf mobility R hypomobile L hypomobile and painful     Special Tests:      Subacromial Pain Syndrome:  Empty Can test: R -, L -  Painful Arc Sign: R -, L -  External Rotation Resistance: R -, L -  Huertas-Rinku Impingement Sign: R -, L +  Palpable Tenderness Infraspinatus and Supraspinatus: R -, L -                 PENELOPE ROM WNL and Strength (5/5) except below:  (* denotes performed with pain)         Shoulder    ROM MMT (-/5)    R L R L     Flex 159 156 (PROM 175) 5 4+*     Ext         Abd (C5) 169 167 (PROM 178) 5 5*     IR T6 T8 (PROM 82) 5 5     ER T4 T3* (PROM 75) 5 3+     Low Trap n/a 3 3-     Mid Trap n/a 3 3      ,          Elbow    ROM MMT (-/5)    R L R L     Flex (C6) WFL WFL 5 5     Ext (C7) WFL WFL 5 5        Assessment  Decreased crepitus again during PROM today and this is making steady improvements. She continues to fatigue with established exercises but they are not painful.    Goals (to be met in 10 visits)   Goals       Therapy Goals      Not Met Progress Toward Partially Met Met   Pt will report improved ability to sleep without waking due to shoulder pain. [] [] [] []   Pt will increase shoulder AROM ER to T4 to be able to reach and  fasten seatbelt. [] [] [] []   Pt will increase shoulder AROM IR to T6 to be able to reach in back pocket, tuck in shirt, and turn steering wheel without pain. [] [] [] []   Pt will improve shoulder strength throughout to 5/5 to improve function with reaching and lifting.  [] [] [] []   Pt will demonstrate increased mid/low trap strength to 3+/5 to promote improved shoulder mechanics and stabilization with lifting and reaching. [] [] [] []   Pt will be independent and compliant with comprehensive HEP to maintain progress achieved in PT. [] [] [] []                   Plan  Cont per POC    Treatment Last 4 Visits       2/5/2025 2/12/2025 2/19/2025 2/24/2025   PT Treatment   Treatment Day 3 4 5 6   Therapeutic Exercise Pulleys 3 min ea flex and abd   Shoulder abd in sidelying 2 lbs x30   Shoulder ER in sidelying 2 lbs x30    Pulleys 3 min ea flex and abd   Shoulder abd in sidelying 2 lbs x30   Shoulder ER in sidelying 2 lbs x10 Dc'd d/t cramp   Pulleys 3 min ea flex and abd   Shoulder abd in sidelying 2 lbs x30   Shoulder ER in sidelying 2 lbs x30   Pulleys 3 min ea flex and abd   Shoulder abd in sidelying 2 lbs x30   Shoulder ER in sidelying 2 lbs x30      Neuro Re-Ed Rows at 60 deg abd green tb x20 B  3 way wall reach yellow tb x15 R and L  Wall slides x30    Rows at 60 deg abd green tb x20 B   3 way wall reach yellow tb x15 R and L   Wall slides x30    Shoulder HABD green tb x20 B   3 way wall reach yellow tb x15 R and L   Wall slides yellow tb x30   Standing back to wall shoulder flex and scap 1 lbs x20   Shoulder HABD green tb x20 B   3 way wall reach yellow tb x15 R and L   Wall slides yellow tb x30   Standing back to wall shoulder flex and scap 1 lbs x20      Manual Therapy Gr III- L AC AP and Inf mobilization   PROM L shoulder Flex, Abd, IR, ER    Gr III- L AC AP and Inf mobilization   PROM L shoulder Flex, Abd, IR, ER    Gr III- L AC AP and Inf mobilization   PROM L shoulder Flex, Abd, IR, ER    Gr III- L AC  AP and Inf mobilization   PROM L shoulder Flex, Abd, IR, ER      Therapeutic Exercise Min 12 12 12 10   Neuro Re-Ed Min 12 10 12 14   Manual Therapy Min 18 18 18 18   Total of Timed Procedures 42 40 42 42   Total of Service Based 0 0 0 0   Total Treatment Time 42 40 42 42         HEP  Shoulder IR green tb  Shoulder ER green tb    Charges     MTx1 TEx1 NMRx1

## 2025-02-26 ENCOUNTER — OFFICE VISIT (OUTPATIENT)
Dept: PHYSICAL THERAPY | Facility: HOSPITAL | Age: 70
End: 2025-02-26
Attending: INTERNAL MEDICINE
Payer: MEDICARE

## 2025-02-26 PROCEDURE — 97140 MANUAL THERAPY 1/> REGIONS: CPT

## 2025-02-26 PROCEDURE — 97110 THERAPEUTIC EXERCISES: CPT

## 2025-02-26 PROCEDURE — 97112 NEUROMUSCULAR REEDUCATION: CPT

## 2025-02-26 NOTE — PROGRESS NOTES
Likely related to previous severe MR  F/u echo as OP      Patient: Chiquita Trevizo (69 year old, female) Referring Provider:  Insurance:   Diagnosis: Acute pain of left shoulder (M25.512) Rod Moreno  MEDICARE   Date of Surgery: NA Next MD visit:  MEDICAID   Precautions:  None NA Referral Information:    Date of Evaluation: Req: 0, Auth: 0, Exp:     01/21/25 POC Auth Visits:  10       Today's Date   2/26/2025    Subjective  The pt states things feel so-so.       Pain: 1/10     Objective  Interpretation provided with language line; From IE:    Observation/Posture: forward head posture; increased thoracic kyphosis; scapula anterior tilt; shoulder internal rotation  Palpation: not TTP          Accessory motion: GH AP, Inf mobility R normal L normal, AC AP, Inf mobility R hypomobile L hypomobile and painful     Special Tests:      Subacromial Pain Syndrome:  Empty Can test: R -, L -  Painful Arc Sign: R -, L -  External Rotation Resistance: R -, L -  Huertas-Rinku Impingement Sign: R -, L +  Palpable Tenderness Infraspinatus and Supraspinatus: R -, L -                 PENELOPE ROM WNL and Strength (5/5) except below:  (* denotes performed with pain)         Shoulder    ROM MMT (-/5)    R L R L     Flex 159 156 (PROM 175) 5 4+*     Ext         Abd (C5) 169 167 (PROM 178) 5 5*     IR T6 T8 (PROM 82) 5 5     ER T4 T3* (PROM 75) 5 3+     Low Trap n/a 3 3-     Mid Trap n/a 3 3      ,          Elbow    ROM MMT (-/5)    R L R L     Flex (C6) WFL WFL 5 5     Ext (C7) WFL WFL 5 5        Assessment  The pt is making good progress with ROM and strength and is progressing towards all goals Scapular stabilization was advanced today with good form and without pain.    Goals (to be met in 10 visits)   Goals       Therapy Goals      Not Met Progress Toward Partially Met Met   Pt will report improved ability to sleep without waking due to shoulder pain. [] [] [] []   Pt will increase shoulder AROM ER to T4 to be able to reach and fasten seatbelt. [] [] [] []   Pt will increase shoulder AROM IR to T6  to be able to reach in back pocket, tuck in shirt, and turn steering wheel without pain. [] [] [] []   Pt will improve shoulder strength throughout to 5/5 to improve function with reaching and lifting.  [] [] [] []   Pt will demonstrate increased mid/low trap strength to 3+/5 to promote improved shoulder mechanics and stabilization with lifting and reaching. [] [] [] []   Pt will be independent and compliant with comprehensive HEP to maintain progress achieved in PT. [] [] [] []                   Plan  Cont per POC    Treatment Last 4 Visits       2/12/2025 2/19/2025 2/24/2025 2/26/2025   PT Treatment   Treatment Day 4 5 6 7   Therapeutic Exercise Pulleys 3 min ea flex and abd   Shoulder abd in sidelying 2 lbs x30   Shoulder ER in sidelying 2 lbs x10 Dc'd d/t cramp   Pulleys 3 min ea flex and abd   Shoulder abd in sidelying 2 lbs x30   Shoulder ER in sidelying 2 lbs x30   Pulleys 3 min ea flex and abd   Shoulder abd in sidelying 2 lbs x30   Shoulder ER in sidelying 2 lbs x30    Pulleys 3 min ea flex and abd   Shoulder abd in sidelying 2 lbs x30   Shoulder ER in sidelying 2 lbs x30      Neuro Re-Ed Rows at 60 deg abd green tb x20 B   3 way wall reach yellow tb x15 R and L   Wall slides x30    Shoulder HABD green tb x20 B   3 way wall reach yellow tb x15 R and L   Wall slides yellow tb x30   Standing back to wall shoulder flex and scap 1 lbs x20   Shoulder HABD green tb x20 B   3 way wall reach yellow tb x15 R and L   Wall slides yellow tb x30   Standing back to wall shoulder flex and scap 1 lbs x20    Shoulder HABD green tb x20 B   3 way wall reach yellow tb x15 R and L   Wall slides yellow tb x30   Standing back to wall shoulder flex and scap 1 lbs x20   Supine shoulder circles 2 lbs x30     Manual Therapy Gr III- L AC AP and Inf mobilization   PROM L shoulder Flex, Abd, IR, ER    Gr III- L AC AP and Inf mobilization   PROM L shoulder Flex, Abd, IR, ER    Gr III- L AC AP and Inf mobilization   PROM L shoulder Flex,  Abd, IR, ER    Gr III- L AC AP and Inf mobilization   PROM L shoulder Flex, Abd, IR, ER      Therapeutic Exercise Min 12 12 10 10   Neuro Re-Ed Min 10 12 14 18   Manual Therapy Min 18 18 18 15   Total of Timed Procedures 40 42 42 43   Total of Service Based 0 0 0 0   Total Treatment Time 40 42 42 43         HEP  Shoulder IR green tb  Shoulder ER green tb    Charges     MTx1 TEx1 NMRx1

## 2025-03-03 ENCOUNTER — OFFICE VISIT (OUTPATIENT)
Dept: PHYSICAL THERAPY | Facility: HOSPITAL | Age: 70
End: 2025-03-03
Attending: INTERNAL MEDICINE
Payer: MEDICARE

## 2025-03-03 PROCEDURE — 97112 NEUROMUSCULAR REEDUCATION: CPT

## 2025-03-03 PROCEDURE — 97110 THERAPEUTIC EXERCISES: CPT

## 2025-03-03 PROCEDURE — 97140 MANUAL THERAPY 1/> REGIONS: CPT

## 2025-03-03 NOTE — PROGRESS NOTES
Patient: Chiquita Trevizo (69 year old, female) Referring Provider:  Insurance:   Diagnosis: Acute pain of left shoulder (M25.512) Rod Moreno  MEDICARE   Date of Surgery: NA Next MD visit:  MEDICAID   Precautions:  None NA Referral Information:    Date of Evaluation: Req: 0, Auth: 0, Exp:     01/21/25 POC Auth Visits:  10       Today's Date   3/3/2025    Subjective  The pt reports she is feeling good. Minimal pain today.       Pain: 1/10     Objective  Interpretation provided with language line; From IE:       Observation/Posture: forward head posture; increased thoracic kyphosis; scapula anterior tilt; shoulder internal rotation  Palpation: not TTP          Accessory motion: GH AP, Inf mobility R normal L normal, AC AP, Inf mobility R hypomobile L hypomobile and painful     Special Tests:      Subacromial Pain Syndrome:  Empty Can test: R -, L -  Painful Arc Sign: R -, L -  External Rotation Resistance: R -, L -  Huertas-Rinku Impingement Sign: R -, L +  Palpable Tenderness Infraspinatus and Supraspinatus: R -, L -                 PENELOPE ROM WNL and Strength (5/5) except below:  (* denotes performed with pain)         Shoulder    ROM MMT (-/5)    R L R L     Flex 159 156 (PROM 175) 5 4+*     Ext         Abd (C5) 169 167 (PROM 178) 5 5*     IR T6 T8 (PROM 82) 5 5     ER T4 T3* (PROM 75) 5 3+     Low Trap n/a 3 3-     Mid Trap n/a 3 3      ,          Elbow    ROM MMT (-/5)    R L R L     Flex (C6) WFL WFL 5 5     Ext (C7) WFL WFL 5 5        Assessment  Stiffness and crepitus in the shoulder continues to decrease. She was able to progress with scapular strengthening with good MT recruitment. Isolated RTC movement and strengthening continues to require cues.    Goals (to be met in 10 visits)   Goals       Therapy Goals      Not Met Progress Toward Partially Met Met   Pt will report improved ability to sleep without waking due to shoulder pain. [] [] [] []   Pt will increase shoulder AROM ER to T4 to be able to reach and  fasten seatbelt. [] [] [] []   Pt will increase shoulder AROM IR to T6 to be able to reach in back pocket, tuck in shirt, and turn steering wheel without pain. [] [] [] []   Pt will improve shoulder strength throughout to 5/5 to improve function with reaching and lifting.  [] [] [] []   Pt will demonstrate increased mid/low trap strength to 3+/5 to promote improved shoulder mechanics and stabilization with lifting and reaching. [] [] [] []   Pt will be independent and compliant with comprehensive HEP to maintain progress achieved in PT. [] [] [] []                   Plan  Cont per POC    Treatment Last 4 Visits       2/19/2025 2/24/2025 2/26/2025 3/3/2025   PT Treatment   Treatment Day 5 6 7 8   Therapeutic Exercise Pulleys 3 min ea flex and abd   Shoulder abd in sidelying 2 lbs x30   Shoulder ER in sidelying 2 lbs x30   Pulleys 3 min ea flex and abd   Shoulder abd in sidelying 2 lbs x30   Shoulder ER in sidelying 2 lbs x30    Pulleys 3 min ea flex and abd   Shoulder abd in sidelying 2 lbs x30   Shoulder ER in sidelying 2 lbs x30    Pulleys flex, abd 3 min ea  Shoulder abd in sidelying 2 lbs x30   Shoulder ER in sidelying 2 lbs x30   Shoulder ext in prone 2 lbs x30      Neuro Re-Ed Shoulder HABD green tb x20 B   3 way wall reach yellow tb x15 R and L   Wall slides yellow tb x30   Standing back to wall shoulder flex and scap 1 lbs x20   Shoulder HABD green tb x20 B   3 way wall reach yellow tb x15 R and L   Wall slides yellow tb x30   Standing back to wall shoulder flex and scap 1 lbs x20    Shoulder HABD green tb x20 B   3 way wall reach yellow tb x15 R and L   Wall slides yellow tb x30   Standing back to wall shoulder flex and scap 1 lbs x20   Supine shoulder circles 2 lbs x30   3 way wall reach yellow tb x15 R and L   Wall slides yellow tb x30   Standing back to wall shoulder flex and scap 1 lbs x20   Supine shoulder circles 2 lbs x30   Prone shoulder HABD x30     Manual Therapy Gr III- L AC AP and Inf  mobilization   PROM L shoulder Flex, Abd, IR, ER    Gr III- L AC AP and Inf mobilization   PROM L shoulder Flex, Abd, IR, ER    Gr III- L AC AP and Inf mobilization   PROM L shoulder Flex, Abd, IR, ER    Gr III- L AC AP and Inf mobilization   PROM L shoulder Flex, Abd, IR, ER      Therapeutic Exercise Min 12 10 10 12   Neuro Re-Ed Min 12 14 18 16   Manual Therapy Min 18 18 15 15   Total of Timed Procedures 42 42 43 43   Total of Service Based 0 0 0 0   Total Treatment Time 42 42 43 43         HEP  Shoulder IR green tb  Shoulder ER green tb    Charges     MTx1 TEx1 NMRx1

## 2025-03-12 ENCOUNTER — LAB ENCOUNTER (OUTPATIENT)
Dept: LAB | Facility: HOSPITAL | Age: 70
End: 2025-03-12
Attending: INTERNAL MEDICINE
Payer: MEDICARE

## 2025-03-12 ENCOUNTER — OFFICE VISIT (OUTPATIENT)
Dept: PHYSICAL THERAPY | Facility: HOSPITAL | Age: 70
End: 2025-03-12
Attending: INTERNAL MEDICINE
Payer: MEDICARE

## 2025-03-12 DIAGNOSIS — R73.01 IMPAIRED FASTING GLUCOSE: ICD-10-CM

## 2025-03-12 DIAGNOSIS — E06.3 HYPOTHYROIDISM DUE TO HASHIMOTO'S THYROIDITIS: ICD-10-CM

## 2025-03-12 LAB
ALBUMIN SERPL-MCNC: 4.6 G/DL (ref 3.2–4.8)
ALBUMIN/GLOB SERPL: 1.8 {RATIO} (ref 1–2)
ALP LIVER SERPL-CCNC: 58 U/L
ALT SERPL-CCNC: 12 U/L
ANION GAP SERPL CALC-SCNC: 7 MMOL/L (ref 0–18)
AST SERPL-CCNC: 15 U/L (ref ?–34)
BILIRUB SERPL-MCNC: 0.8 MG/DL (ref 0.2–1.1)
BUN BLD-MCNC: 12 MG/DL (ref 9–23)
BUN/CREAT SERPL: 14.6 (ref 10–20)
CALCIUM BLD-MCNC: 9.2 MG/DL (ref 8.7–10.4)
CHLORIDE SERPL-SCNC: 110 MMOL/L (ref 98–112)
CO2 SERPL-SCNC: 26 MMOL/L (ref 21–32)
CREAT BLD-MCNC: 0.82 MG/DL
EGFRCR SERPLBLD CKD-EPI 2021: 77 ML/MIN/1.73M2 (ref 60–?)
EST. AVERAGE GLUCOSE BLD GHB EST-MCNC: 143 MG/DL (ref 68–126)
FASTING STATUS PATIENT QL REPORTED: YES
GLOBULIN PLAS-MCNC: 2.5 G/DL (ref 2–3.5)
GLUCOSE BLD-MCNC: 118 MG/DL (ref 70–99)
HBA1C MFR BLD: 6.6 % (ref ?–5.7)
OSMOLALITY SERPL CALC.SUM OF ELEC: 297 MOSM/KG (ref 275–295)
POTASSIUM SERPL-SCNC: 4 MMOL/L (ref 3.5–5.1)
PROT SERPL-MCNC: 7.1 G/DL (ref 5.7–8.2)
SODIUM SERPL-SCNC: 143 MMOL/L (ref 136–145)
T4 FREE SERPL-MCNC: 1.4 NG/DL (ref 0.8–1.7)
TSI SER-ACNC: 0.33 UIU/ML (ref 0.55–4.78)

## 2025-03-12 PROCEDURE — 84439 ASSAY OF FREE THYROXINE: CPT

## 2025-03-12 PROCEDURE — 80053 COMPREHEN METABOLIC PANEL: CPT

## 2025-03-12 PROCEDURE — 36415 COLL VENOUS BLD VENIPUNCTURE: CPT

## 2025-03-12 PROCEDURE — 97140 MANUAL THERAPY 1/> REGIONS: CPT

## 2025-03-12 PROCEDURE — 83036 HEMOGLOBIN GLYCOSYLATED A1C: CPT

## 2025-03-12 PROCEDURE — 97112 NEUROMUSCULAR REEDUCATION: CPT

## 2025-03-12 PROCEDURE — 97110 THERAPEUTIC EXERCISES: CPT

## 2025-03-12 PROCEDURE — 84443 ASSAY THYROID STIM HORMONE: CPT

## 2025-03-12 NOTE — PROGRESS NOTES
Patient: Chiquita Trevizo (69 year old, female) Referring Provider:  Insurance:   Diagnosis: Acute pain of left shoulder (M25.512) Rod Moreno  MEDICARE   Date of Surgery: NA Next MD visit:  MEDICAID   Precautions:  None NA Referral Information:    Date of Evaluation: Req: 0, Auth: 0, Exp:     01/21/25 POC Auth Visits:  10       Today's Date   3/12/2025    Subjective  The pt states the shoulder feels \"fine\". No pain today. States she did just have bloodwork done so her arm is a little sore.       Pain: 0/10     Objective  Interpretation provided with language line; From IE:          Observation/Posture: forward head posture; increased thoracic kyphosis; scapula anterior tilt; shoulder internal rotation  Palpation: not TTP          Accessory motion: GH AP, Inf mobility R normal L normal, AC AP, Inf mobility R hypomobile L hypomobile and painful     Special Tests:      Subacromial Pain Syndrome:  Empty Can test: R -, L -  Painful Arc Sign: R -, L -  External Rotation Resistance: R -, L -  Huertas-Rinku Impingement Sign: R -, L +  Palpable Tenderness Infraspinatus and Supraspinatus: R -, L -                 PENELOPE ROM WNL and Strength (5/5) except below:  (* denotes performed with pain)         Shoulder    ROM MMT (-/5)    R L R L     Flex 159 156 (PROM 175) 5 4+*     Ext         Abd (C5) 169 167 (PROM 178) 5 5*     IR T6 T8 (PROM 82) 5 5     ER T4 T3* (PROM 75) 5 3+     Low Trap n/a 3 3-     Mid Trap n/a 3 3      ,          Elbow    ROM MMT (-/5)    R L R L     Flex (C6) WFL WFL 5 5     Ext (C7) WFL WFL 5 5        Assessment  ER strengthening was better isolated today after initial cues. Increased time spent with AAROM due to soreness from recent bloodwork. She was able to progress with UE strengthening again.    Goals (to be met in 10 visits)   Therapy Goals          Not Met Progress Toward Partially Met Met   Pt will report improved ability to sleep without waking due to shoulder pain. []  []  []  []    Pt will increase  shoulder AROM ER to T4 to be able to reach and fasten seatbelt. []  []  []  []    Pt will increase shoulder AROM IR to T6 to be able to reach in back pocket, tuck in shirt, and turn steering wheel without pain. []  []  []  []    Pt will improve shoulder strength throughout to 5/5 to improve function with reaching and lifting.  []  []  []  []    Pt will demonstrate increased mid/low trap strength to 3+/5 to promote improved shoulder mechanics and stabilization with lifting and reaching. []  []  []  []    Pt will be independent and compliant with comprehensive HEP to maintain progress achieved in PT. []  []  []  []            Plan  Cont per POC    Treatment Last 4 Visits       2/24/2025 2/26/2025 3/3/2025 3/12/2025   PT Treatment   Treatment Day 6 7 8 9   Therapeutic Exercise Pulleys 3 min ea flex and abd   Shoulder abd in sidelying 2 lbs x30   Shoulder ER in sidelying 2 lbs x30    Pulleys 3 min ea flex and abd   Shoulder abd in sidelying 2 lbs x30   Shoulder ER in sidelying 2 lbs x30    Pulleys flex, abd 3 min ea  Shoulder abd in sidelying 2 lbs x30   Shoulder ER in sidelying 2 lbs x30   Shoulder ext in prone 2 lbs x30       Neuro Re-Ed Shoulder HABD green tb x20 B   3 way wall reach yellow tb x15 R and L   Wall slides yellow tb x30   Standing back to wall shoulder flex and scap 1 lbs x20    Shoulder HABD green tb x20 B   3 way wall reach yellow tb x15 R and L   Wall slides yellow tb x30   Standing back to wall shoulder flex and scap 1 lbs x20   Supine shoulder circles 2 lbs x30   3 way wall reach yellow tb x15 R and L   Wall slides yellow tb x30   Standing back to wall shoulder flex and scap 1 lbs x20   Supine shoulder circles 2 lbs x30   Prone shoulder HABD x30      Manual Therapy Gr III- L AC AP and Inf mobilization   PROM L shoulder Flex, Abd, IR, ER    Gr III- L AC AP and Inf mobilization   PROM L shoulder Flex, Abd, IR, ER    Gr III- L AC AP and Inf mobilization   PROM L shoulder Flex, Abd, IR, ER        Therapeutic Exercise Min 10 10 12    Neuro Re-Ed Min 14 18 16    Manual Therapy Min 18 15 15    Total of Timed Procedures 42 43 43    Total of Service Based 0 0 0    Total Treatment Time 42 43 43           2/24/2025 2/26/2025 3/3/2025 3/12/2025   UE Treatment   Treatment Day 6 7 8 9   Therapeutic Exercise    Pulleys flex, abd 4 min ea   Shoulder abd in sidelying 2 lbs x40   Shoulder ER in sidelying 2 lbs x20   Shoulder ext in prone 2 lbs x30      Neuro Re-Education    3 way wall reach yellow tb x15 R and L   Wall slides yellow tb x30   Prone shoulder HABD x30   Prone rows 2 lbs x30     Manual Therapy    Gr III- L AC AP and Inf mobilization   PROM L shoulder Flex, Abd, IR, ER      Therapeutic Exercise Minutes    15   Neuro Re-Educ Minutes    12   Manual Therapy Minutes    15   Total Time Of Timed Procedures    42   Total Time Of Service-Based Procedures    0   Total Treatment Time    42        HEP       Charges  MTx1 TEx1 NMRx1

## 2025-03-18 ENCOUNTER — OFFICE VISIT (OUTPATIENT)
Dept: PHYSICAL THERAPY | Facility: HOSPITAL | Age: 70
End: 2025-03-18
Attending: INTERNAL MEDICINE
Payer: MEDICARE

## 2025-03-18 PROCEDURE — 97110 THERAPEUTIC EXERCISES: CPT

## 2025-03-18 PROCEDURE — 97112 NEUROMUSCULAR REEDUCATION: CPT

## 2025-03-18 PROCEDURE — 97140 MANUAL THERAPY 1/> REGIONS: CPT

## 2025-03-18 NOTE — PROGRESS NOTES
Patient: Chiquita Trevizo (69 year old, female) Referring Provider:  Insurance:   Diagnosis: Acute pain of left shoulder (M25.512) Rod Moreno  MEDICARE   Date of Surgery: NA Next MD visit:  MEDICAID   Precautions:  None NA Referral Information:    Date of Evaluation: Req: 0, Auth: 0, Exp:     01/21/25 POC Auth Visits:  10       Today's Date   3/18/2025    Subjective  The pt states she is feeling better, has less pain in the shoulder. States still some pain with sleeping but this depends on the position, sometimes no pain.       Pain: 0/10     Objective  Interpretation provided with language line;    3/18/25:   Observation/Posture: forward head posture; increased thoracic kyphosis; scapula anterior tilt; shoulder internal rotation  Palpation: not TTP          Accessory motion: GH AP, Inf mobility R normal L normal, AC AP, Inf mobility R hypomobile L hypomobile and painful     Special Tests:   Subacromial Pain Syndrome:  Empty Can test: R -, L -  Painful Arc Sign: R -, L -  External Rotation Resistance: R -, L -  Huertas-Rinku Impingement Sign: R -, L +  Palpable Tenderness Infraspinatus and Supraspinatus: R -, L -                 PENELOPE ROM WNL and Strength (5/5) except below:  (* denotes performed with pain)         Shoulder    ROM MMT (-/5)    R L R L     Flex 161 162 5 5     Abd (C5) 178 171* 5* 5*     IR T5 T7 5 5     ER T4 T3 5 4+     Low Trap n/a 3 3-     Mid Trap n/a 3 3      ,          Elbow    ROM MMT (-/5)    R L R L     Flex (C6) WFL WFL 5 5     Ext (C7) WFL WFL 5 5        Assessment  The pt presents with reduced pain, improved shoulder ROM, and LUE strength gains. She has improved ability for reaching and lifting, but ROM and strength are not yet WFLs. This continues to limit her tolerance for reaching behind, heavier lifting and carrying, OH activities, and sleeping. She will benefit from continued skilled PT to address the above limitations and return to PLOF.    Goals (to be met in 10 visits)   Therapy  Goals          Not Met Progress Toward Partially Met Met   Pt will report improved ability to sleep without waking due to shoulder pain. []  []  [x]  []    Pt will increase shoulder AROM ER to T4 to be able to reach and fasten seatbelt. []  []  []  [x]    Pt will increase shoulder AROM IR to T6 to be able to reach in back pocket, tuck in shirt, and turn steering wheel without pain. []  [x]  []  []    Pt will improve shoulder strength throughout to 5/5 to improve function with reaching and lifting.  []  [x]  []  []    Pt will demonstrate increased mid/low trap strength to 3+/5 to promote improved shoulder mechanics and stabilization with lifting and reaching. []  [x]  []  []    Pt will be independent and compliant with comprehensive HEP to maintain progress achieved in PT. []  [x]  []  []              Plan  Continue with skilled PT 1-2x/week or a total of 8 add'l  visits over a 90 day period. Treatment will include: Manual Therapy; Neuromuscular Re-education; Therapeutic Activities; Therapeutic Exercise; Home Exercise Program instruction    Treatment Last 4 Visits       2/26/2025 3/3/2025 3/12/2025 3/18/2025   PT Treatment   Treatment Day 7 8 9 10   Therapeutic Exercise Pulleys 3 min ea flex and abd   Shoulder abd in sidelying 2 lbs x30   Shoulder ER in sidelying 2 lbs x30    Pulleys flex, abd 3 min ea  Shoulder abd in sidelying 2 lbs x30   Shoulder ER in sidelying 2 lbs x30   Shoulder ext in prone 2 lbs x30        Neuro Re-Ed Shoulder HABD green tb x20 B   3 way wall reach yellow tb x15 R and L   Wall slides yellow tb x30   Standing back to wall shoulder flex and scap 1 lbs x20   Supine shoulder circles 2 lbs x30   3 way wall reach yellow tb x15 R and L   Wall slides yellow tb x30   Standing back to wall shoulder flex and scap 1 lbs x20   Supine shoulder circles 2 lbs x30   Prone shoulder HABD x30       Manual Therapy Gr III- L AC AP and Inf mobilization   PROM L shoulder Flex, Abd, IR, ER    Gr III- L AC AP and Inf  mobilization   PROM L shoulder Flex, Abd, IR, ER        Therapeutic Exercise Min 10 12     Neuro Re-Ed Min 18 16     Manual Therapy Min 15 15     Total of Timed Procedures 43 43     Total of Service Based 0 0     Total Treatment Time 43 43            2/26/2025 3/3/2025 3/12/2025 3/18/2025   UE Treatment   Treatment Day 7 8 9 10   Therapeutic Exercise   Pulleys flex, abd 4 min ea   Shoulder abd in sidelying 2 lbs x40   Shoulder ER in sidelying 2 lbs x20   Shoulder ext in prone 2 lbs x30    Reassessment  Pulleys flex, abd 4 min ea  Shoulder abd in sidelying 2 lbs x40  Shoulder ER in sidelying 2 lbs x20  Shoulder ext in prone 2 lbs x30     Neuro Re-Education   3 way wall reach yellow tb x15 R and L   Wall slides yellow tb x30   Prone shoulder HABD x30   Prone rows 2 lbs x30   3 way wall reach yellow tb x15 R and L   Wall slides yellow tb x30   Prone shoulder HABD x30   Prone rows 2 lbs x30      Manual Therapy   Gr III- L AC AP and Inf mobilization   PROM L shoulder Flex, Abd, IR, ER    Gr III- L AC AP and Inf mobilization   PROM L shoulder Flex, Abd, IR, ER      Therapeutic Exercise Minutes   15 18   Neuro Re-Educ Minutes   12 12   Manual Therapy Minutes   15 15   Total Time Of Timed Procedures   42 45   Total Time Of Service-Based Procedures   0 0   Total Treatment Time   42 45   HEP    Shoulder IR green tb  Shoulder ER green tb          HEP  Shoulder IR green tb  Shoulder ER green tb      Charges  MTx1 TEx1 NMRx1

## 2025-03-26 ENCOUNTER — OFFICE VISIT (OUTPATIENT)
Dept: PHYSICAL THERAPY | Facility: HOSPITAL | Age: 70
End: 2025-03-26
Attending: INTERNAL MEDICINE
Payer: MEDICARE

## 2025-03-26 PROCEDURE — 97110 THERAPEUTIC EXERCISES: CPT

## 2025-03-26 PROCEDURE — 97140 MANUAL THERAPY 1/> REGIONS: CPT

## 2025-03-26 PROCEDURE — 97112 NEUROMUSCULAR REEDUCATION: CPT

## 2025-03-26 NOTE — PROGRESS NOTES
Patient: Chiquita Trevizo (69 year old, female) Referring Provider:  Insurance:   Diagnosis: Acute pain of left shoulder (M25.512) Rod Moreno  MEDICARE   Date of Surgery: NA Next MD visit:  MEDICAID   Precautions:  None NA Referral Information:    Date of Evaluation: Req: 0, Auth: 0, Exp:     01/21/25 POC Auth Visits:  18       Today's Date   3/26/2025    Subjective  The pt states things are feeling good. No pain currently.       Pain: 0/10     Objective  Interpretation provided with language line;       3/18/25:   Observation/Posture: forward head posture; increased thoracic kyphosis; scapula anterior tilt; shoulder internal rotation  Palpation: not TTP          Accessory motion: GH AP, Inf mobility R normal L normal, AC AP, Inf mobility R hypomobile L hypomobile and painful     Special Tests:   Subacromial Pain Syndrome:  Empty Can test: R -, L -  Painful Arc Sign: R -, L -  External Rotation Resistance: R -, L -  Huertas-Rinku Impingement Sign: R -, L +  Palpable Tenderness Infraspinatus and Supraspinatus: R -, L -                 PENELOPE ROM WNL and Strength (5/5) except below:  (* denotes performed with pain)         Shoulder    ROM MMT (-/5)    R L R L     Flex 161 162 5 5     Abd (C5) 178 171* 5* 5*     IR T5 T7 5 5     ER T4 T3 5 4+     Low Trap n/a 3 3-     Mid Trap n/a 3 3      ,          Elbow    ROM MMT (-/5)    R L R L     Flex (C6) WFL WFL 5 5     Ext (C7) WFL WFL 5 5            Assessment  Scapular strength is making steady improvements, allowing for increased ability for reaching and lifting.    Goals (to be met in 10 visits)   Therapy Goals          Not Met Progress Toward Partially Met Met   Pt will report improved ability to sleep without waking due to shoulder pain. []  []  [x]  []    Pt will increase shoulder AROM ER to T4 to be able to reach and fasten seatbelt. []  []  []  [x]    Pt will increase shoulder AROM IR to T6 to be able to reach in back pocket, tuck in shirt, and turn steering wheel  without pain. []  [x]  []  []    Pt will improve shoulder strength throughout to 5/5 to improve function with reaching and lifting.  []  [x]  []  []    Pt will demonstrate increased mid/low trap strength to 3+/5 to promote improved shoulder mechanics and stabilization with lifting and reaching. []  [x]  []  []    Pt will be independent and compliant with comprehensive HEP to maintain progress achieved in PT. []  [x]  []  []                  Plan  Cont per POC    Treatment Last 4 Visits  Treatment Day: 11       3/12/2025 3/18/2025 3/26/2025   UE Treatment   Therapeutic Exercise Pulleys flex, abd 4 min ea   Shoulder abd in sidelying 2 lbs x40   Shoulder ER in sidelying 2 lbs x20   Shoulder ext in prone 2 lbs x30    Reassessment  Pulleys flex, abd 4 min ea  Shoulder abd in sidelying 2 lbs x40  Shoulder ER in sidelying 2 lbs x20  Shoulder ext in prone 2 lbs x30   Pulleys flex, abd 4 min ea   Shoulder abd in sidelying 2 lbs x40   Shoulder ER in sidelying 2 lbs x20   Shoulder ext in prone 2 lbs x30      Neuro Re-Education 3 way wall reach yellow tb x15 R and L   Wall slides yellow tb x30   Prone shoulder HABD x30   Prone rows 2 lbs x30   3 way wall reach yellow tb x15 R and L   Wall slides yellow tb x30   Prone shoulder HABD x30   Prone rows 2 lbs x30    3 way wall reach yellow tb x15 R and L   Wall slides yellow tb x30   Prone shoulder HABD 1 lb x30   Prone rows 2 lbs x30      Manual Therapy Gr III- L AC AP and Inf mobilization   PROM L shoulder Flex, Abd, IR, ER    Gr III- L AC AP and Inf mobilization   PROM L shoulder Flex, Abd, IR, ER    Gr III- L AC AP and Inf mobilization   PROM L shoulder Flex, Abd, IR, ER      Therapeutic Exercise Minutes 15 18 18   Neuro Re-Educ Minutes 12 12 12   Manual Therapy Minutes 15 15 15   Total Time Of Timed Procedures 42 45 45   Total Time Of Service-Based Procedures 0 0 0   Total Treatment Time 42 45 45   HEP  Shoulder IR green tb  Shoulder ER green tb           HEP  Shoulder IR green  tb  Shoulder ER green tb      Charges  MTx1 TEx1 NMRx1

## 2025-04-01 ENCOUNTER — OFFICE VISIT (OUTPATIENT)
Dept: PHYSICAL THERAPY | Facility: HOSPITAL | Age: 70
End: 2025-04-01
Attending: INTERNAL MEDICINE
Payer: MEDICARE

## 2025-04-01 PROCEDURE — 97140 MANUAL THERAPY 1/> REGIONS: CPT

## 2025-04-01 PROCEDURE — 97112 NEUROMUSCULAR REEDUCATION: CPT

## 2025-04-01 PROCEDURE — 97110 THERAPEUTIC EXERCISES: CPT

## 2025-04-01 NOTE — PROGRESS NOTES
Discharge Summary  Pt has attended 12 visits in Physical Therapy.     Patient: Chiquita Trevizo (69 year old, female) Referring Provider:  Insurance:   Diagnosis: Acute pain of left shoulder (M25.512) Agron B Elezi MEDICARE   Date of Surgery: NA Next MD visit:  MEDICAID   Precautions:  None NA Referral Information:    Date of Evaluation: Req: 0, Auth: 0, Exp:     01/21/25 POC Auth Visits:  18       Today's Date   4/1/2025    Subjective  The pt states she is feeling good today, denies current pain. States she is able to sleep without shoulder pain. States she no longer feels limited due to her shoulder pain.       Pain: 0/10     Objective  Interpretation provided with language line;     4/1/25:   Observation/Posture: forward head posture; increased thoracic kyphosis; scapula anterior tilt; shoulder internal rotation  Palpation: not TTP          Accessory motion: GH AP, Inf mobility R normal L normal, AC AP, Inf mobility R hypomobile L hypomobile and painful     Special Tests:   Subacromial Pain Syndrome:  Empty Can test: R -, L -  Painful Arc Sign: R -, L -  External Rotation Resistance: R -, L -  Huertas-Rinku Impingement Sign: R -, L -  Palpable Tenderness Infraspinatus and Supraspinatus: R -, L -                 PENELOPE ROM WNL and Strength (5/5) except below:  (* denotes performed with pain)         Shoulder    ROM MMT (-/5)    R L R L     Flex 161 164 5 5     Abd (C5) 178 175 5* 5     IR T5 T5 5 5     ER T4 T1 5 3+     Low Trap n/a 3 3+     Mid Trap n/a 3 3+      ,          Elbow    ROM MMT (-/5)    R L R L     Flex (C6) WFL WFL 5 5     Ext (C7) WFL WFL 5 5        Assessment  The pt presents with resolved pain, improved shoulder ROM to WFLS, and increased UE strength that is near WFLs. She is no longer functionally limited and is independent with her HEP. Skilled PT is no longer indicated at this time.    Goals (to be met in 10 visits)   Therapy Goals          Not Met Progress Toward Partially Met Met   Pt will report  improved ability to sleep without waking due to shoulder pain. []  []  []  [x]    Pt will increase shoulder AROM ER to T4 to be able to reach and fasten seatbelt. []  []  [x]  []    Pt will increase shoulder AROM IR to T6 to be able to reach in back pocket, tuck in shirt, and turn steering wheel without pain. []  []  []  [x]    Pt will improve shoulder strength throughout to 5/5 to improve function with reaching and lifting.  []  []  [x]  []    Pt will demonstrate increased mid/low trap strength to 3+/5 to promote improved shoulder mechanics and stabilization with lifting and reaching. []  []  []  [x]    Pt will be independent and compliant with comprehensive HEP to maintain progress achieved in PT. []  []  []  [x]          Plan  DC to HEP    Treatment Last 4 Visits  Treatment Day: 12       3/12/2025 3/18/2025 3/26/2025 4/1/2025   UE Treatment   Therapeutic Exercise Pulleys flex, abd 4 min ea   Shoulder abd in sidelying 2 lbs x40   Shoulder ER in sidelying 2 lbs x20   Shoulder ext in prone 2 lbs x30    Reassessment  Pulleys flex, abd 4 min ea  Shoulder abd in sidelying 2 lbs x40  Shoulder ER in sidelying 2 lbs x20  Shoulder ext in prone 2 lbs x30   Pulleys flex, abd 4 min ea   Shoulder abd in sidelying 2 lbs x40   Shoulder ER in sidelying 2 lbs x20   Shoulder ext in prone 2 lbs x30    Reassessment   Shoulder abd in sidelying 2 lbs x40   Shoulder ER in sidelying 2 lbs x30   Shoulder ext in prone 2 lbs x30      Neuro Re-Education 3 way wall reach yellow tb x15 R and L   Wall slides yellow tb x30   Prone shoulder HABD x30   Prone rows 2 lbs x30   3 way wall reach yellow tb x15 R and L   Wall slides yellow tb x30   Prone shoulder HABD x30   Prone rows 2 lbs x30    3 way wall reach yellow tb x15 R and L   Wall slides yellow tb x30   Prone shoulder HABD 1 lb x30   Prone rows 2 lbs x30    Prone shoulder HABD 1 lb x30   Prone rows 2 lbs x30      Manual Therapy Gr III- L AC AP and Inf mobilization   PROM L shoulder Flex,  Abd, IR, ER    Gr III- L AC AP and Inf mobilization   PROM L shoulder Flex, Abd, IR, ER    Gr III- L AC AP and Inf mobilization   PROM L shoulder Flex, Abd, IR, ER    Gr III- L AC AP and Inf mobilization   PROM L shoulder Flex, Abd, IR, ER      Therapeutic Exercise Minutes 15 18 18 18   Neuro Re-Educ Minutes 12 12 12 8   Manual Therapy Minutes 15 15 15 15   Total Time Of Timed Procedures 42 45 45 41   Total Time Of Service-Based Procedures 0 0 0 0   Total Treatment Time 42 45 45 41   HEP  Shoulder IR green tb  Shoulder ER green tb    Shoulder abd in sidelying 2 lbs   Shoulder ER in sidelying 2 lbs         HEP  Shoulder abd in sidelying 2 lbs   Shoulder ER in sidelying 2 lbs       Charges  MTx1 TEx1 NMRx1

## 2025-04-03 ENCOUNTER — OFFICE VISIT (OUTPATIENT)
Dept: PHYSICAL MEDICINE AND REHAB | Facility: CLINIC | Age: 70
End: 2025-04-03
Payer: MEDICARE

## 2025-04-03 VITALS
WEIGHT: 156 LBS | HEIGHT: 63 IN | DIASTOLIC BLOOD PRESSURE: 70 MMHG | SYSTOLIC BLOOD PRESSURE: 140 MMHG | BODY MASS INDEX: 27.64 KG/M2

## 2025-04-03 DIAGNOSIS — M19.012 ARTHRITIS OF LEFT ACROMIOCLAVICULAR JOINT: ICD-10-CM

## 2025-04-03 DIAGNOSIS — G89.29 CHRONIC LEFT SHOULDER PAIN: Primary | ICD-10-CM

## 2025-04-03 DIAGNOSIS — M67.919 TENDINOPATHY OF ROTATOR CUFF, UNSPECIFIED LATERALITY: ICD-10-CM

## 2025-04-03 DIAGNOSIS — M19.012 OSTEOARTHRITIS OF LEFT GLENOHUMERAL JOINT: ICD-10-CM

## 2025-04-03 DIAGNOSIS — M25.512 CHRONIC LEFT SHOULDER PAIN: Primary | ICD-10-CM

## 2025-04-03 PROCEDURE — 99213 OFFICE O/P EST LOW 20 MIN: CPT | Performed by: PHYSICAL MEDICINE & REHABILITATION

## 2025-04-03 NOTE — PATIENT INSTRUCTIONS
1) Tylenol 500-1000 mg every 6-8 hours as needed for pain.  No more than 3000 mg daily.  2) Follow up as needed

## 2025-04-03 NOTE — PROGRESS NOTES
[facility]  Progress Note    CHIEF COMPLAINT:    Chief Complaint   Patient presents with    Follow - Up     LOV 1/22/25 for chronic L shoulder pain. Pain 0/10, denies N/T, weakness. PT, HEP helped tremendously. Pt took medrol pack with improvement, stopped cyclobenzaprine due to raised BP.        History of Present Illness  Chiquita Trevizo is a 69 year old female who presents for follow-up of shoulder pain.    Significant improvement in shoulder pain, now rated as zero out of ten.    Completed a full course of physical therapy as of yesterday and has been performing exercises at home.    Oral steroids were helpful in managing symptoms. Muscle relaxer was discontinued due to increased blood pressure.    Currently not taking any other medication for shoulder pain.       PAST MEDICAL HISTORY:  History reviewed. No pertinent past medical history.    SURGICAL HISTORY:  History reviewed. No pertinent surgical history.    SOCIAL HISTORY:   Social History     Occupational History    Not on file   Tobacco Use    Smoking status: Never     Passive exposure: Never    Smokeless tobacco: Never   Vaping Use    Vaping status: Never Used   Substance and Sexual Activity    Alcohol use: Never    Drug use: Never    Sexual activity: Not on file       FAMILY HISTORY:   History reviewed. No pertinent family history.    CURRENT MEDICATIONS:   Current Outpatient Medications   Medication Sig Dispense Refill    levothyroxine 100 MCG Oral Tab Take 1 tablet (100 mcg total) by mouth before breakfast. 90 tablet 3    simvastatin 10 MG Oral Tab Take 1 tablet (10 mg total) by mouth nightly. 90 tablet 1    aspirin 81 MG Oral Tab EC Take 1 tablet (81 mg total) by mouth daily.      Cholecalciferol (VITAMIN D) 25 MCG (1000 UT) Oral Tab Take by mouth daily.         ALLERGIES:   Allergies[1]    REVIEW OF SYSTEMS:   Review of Systems   Constitutional: Negative.    HENT: Negative.    Eyes: Negative.    Respiratory:  Negative.    Cardiovascular: Negative.    Gastrointestinal: Negative.    Genitourinary: Negative.    Musculoskeletal: As per HPI  Skin: Negative.    Neurological: As per HPI  Endo/Heme/Allergies: Negative.    Psychiatric/Behavioral: Negative.      All other systems reviewed and are negative. Pertinent positives and negatives noted in the HPI.    PHYSICAL EXAM:   /70   Ht 63\"   Wt 156 lb (70.8 kg)   BMI 27.63 kg/m²     Body mass index is 27.63 kg/m².      General: No immediate distress  Head: Normocephalic/ Atraumatic  Eyes: Extra-occular movements intact.   Ears: No auricular hematoma or deformities  Mouth: No lesions or ulcerations  Heart: peripheral pulses intact. Normal capillary refill.   Lungs: Non-labored respirations  Abdomen: No abdominal guarding  Extremities: No lower extremity edema bilaterally   Skin: No lesions noted.   Cognition: alert & oriented x 3, attentive, able to follow 2 step commands, comprehension intact, spontaneous speech intact  Motor:    Musculoskeletal:        Data  [facility] Lab Encounter on 03/12/2025   Component Date Value Ref Range Status    Glucose 03/12/2025 118 (H)  70 - 99 mg/dL Final    Sodium 03/12/2025 143  136 - 145 mmol/L Final    Potassium 03/12/2025 4.0  3.5 - 5.1 mmol/L Final    Chloride 03/12/2025 110  98 - 112 mmol/L Final    CO2 03/12/2025 26.0  21.0 - 32.0 mmol/L Final    Anion Gap 03/12/2025 7  0 - 18 mmol/L Final    BUN 03/12/2025 12  9 - 23 mg/dL Final    Creatinine 03/12/2025 0.82  0.55 - 1.02 mg/dL Final    BUN/CREA Ratio 03/12/2025 14.6  10.0 - 20.0 Final    Calcium, Total 03/12/2025 9.2  8.7 - 10.4 mg/dL Final    Calculated Osmolality 03/12/2025 297 (H)  275 - 295 mOsm/kg Final    eGFR-Cr 03/12/2025 77  >=60 mL/min/1.73m2 Final    ALT 03/12/2025 12  10 - 49 U/L Final    AST 03/12/2025 15  <34 U/L Final    Alkaline Phosphatase 03/12/2025 58  55 - 142 U/L Final    Bilirubin, Total 03/12/2025 0.8  0.2 - 1.1 mg/dL Final    Total Protein 03/12/2025 7.1  5.7 - 8.2  g/dL Final    Albumin 03/12/2025 4.6  3.2 - 4.8 g/dL Final    Globulin  03/12/2025 2.5  2.0 - 3.5 g/dL Final    A/G Ratio 03/12/2025 1.8  1.0 - 2.0 Final    Patient Fasting for CMP? 03/12/2025 Yes   Final    HgbA1C 03/12/2025 6.6 (H)  <5.7 % Final    Estimated Average Glucose 03/12/2025 143 (H)  68 - 126 mg/dL Final    TSH 03/12/2025 0.332 (L)  0.550 - 4.780 uIU/mL Final    Free T4 03/12/2025 1.4  0.8 - 1.7 ng/dL Final   [facility] Lab Encounter on 01/18/2025   Component Date Value Ref Range Status    Occult Blood 01/18/2025 Negative  Negative Final   [facility] Lab Encounter on 12/02/2024   Component Date Value Ref Range Status    WBC 12/02/2024 7.9  4.0 - 11.0 x10(3) uL Final    RBC 12/02/2024 4.55  3.80 - 5.30 x10(6)uL Final    HGB 12/02/2024 13.8  12.0 - 16.0 g/dL Final    HCT 12/02/2024 40.9  35.0 - 48.0 % Final    MCV 12/02/2024 89.9  80.0 - 100.0 fL Final    MCH 12/02/2024 30.3  26.0 - 34.0 pg Final    MCHC 12/02/2024 33.7  31.0 - 37.0 g/dL Final    RDW-SD 12/02/2024 42.9  35.1 - 46.3 fL Final    RDW 12/02/2024 13.2  11.0 - 15.0 % Final    PLT 12/02/2024 255.0  150.0 - 450.0 10(3)uL Final    Neutrophil Absolute Prelim 12/02/2024 3.47  1.50 - 7.70 x10 (3) uL Final    Neutrophil Absolute 12/02/2024 3.47  1.50 - 7.70 x10(3) uL Final    Lymphocyte Absolute 12/02/2024 3.33  1.00 - 4.00 x10(3) uL Final    Monocyte Absolute 12/02/2024 0.68  0.10 - 1.00 x10(3) uL Final    Eosinophil Absolute 12/02/2024 0.30  0.00 - 0.70 x10(3) uL Final    Basophil Absolute 12/02/2024 0.09  0.00 - 0.20 x10(3) uL Final    Immature Granulocyte Absolute 12/02/2024 0.04  0.00 - 1.00 x10(3) uL Final    Neutrophil % 12/02/2024 43.9  % Final    Lymphocyte % 12/02/2024 42.1  % Final    Monocyte % 12/02/2024 8.6  % Final    Eosinophil % 12/02/2024 3.8  % Final    Basophil % 12/02/2024 1.1  % Final    Immature Granulocyte % 12/02/2024 0.5  % Final    Glucose 12/02/2024 115 (H)  70 - 99 mg/dL Final    Sodium 12/02/2024 141  136 - 145 mmol/L Final    Potassium  12/02/2024 3.9  3.5 - 5.1 mmol/L Final    Chloride 12/02/2024 108  98 - 112 mmol/L Final    CO2 12/02/2024 29.0  21.0 - 32.0 mmol/L Final    Anion Gap 12/02/2024 4  0 - 18 mmol/L Final    BUN 12/02/2024 14  9 - 23 mg/dL Final    Creatinine 12/02/2024 0.96  0.55 - 1.02 mg/dL Final    BUN/CREA Ratio 12/02/2024 14.6  10.0 - 20.0 Final    Calcium, Total 12/02/2024 10.7 (H)  8.7 - 10.4 mg/dL Final    Calculated Osmolality 12/02/2024 293  275 - 295 mOsm/kg Final    eGFR-Cr 12/02/2024 64  >=60 mL/min/1.73m2 Final    ALT 12/02/2024 13  10 - 49 U/L Final    AST 12/02/2024 17  <34 U/L Final    Alkaline Phosphatase 12/02/2024 73  55 - 142 U/L Final    Bilirubin, Total 12/02/2024 0.7  0.2 - 1.1 mg/dL Final    Total Protein 12/02/2024 7.5  5.7 - 8.2 g/dL Final    Albumin 12/02/2024 4.7  3.2 - 4.8 g/dL Final    Globulin  12/02/2024 2.8  2.0 - 3.5 g/dL Final    A/G Ratio 12/02/2024 1.7  1.0 - 2.0 Final    Patient Fasting for CMP? 12/02/2024 Yes   Final    Cholesterol, Total 12/02/2024 215 (H)  <200 mg/dL Final    HDL Cholesterol 12/02/2024 52  40 - 59 mg/dL Final    Triglycerides 12/02/2024 166 (H)  30 - 149 mg/dL Final    LDL Cholesterol 12/02/2024 133 (H)  <100 mg/dL Final    VLDL 12/02/2024 30  0 - 30 mg/dL Final    Non HDL Chol 12/02/2024 163 (H)  <130 mg/dL Final    Patient Fasting for Lipid? 12/02/2024 Yes   Final    TSH 12/02/2024 1.336  0.550 - 4.780 uIU/mL Final    Urine Color 12/02/2024 Light-Yellow  Yellow Final    Clarity Urine 12/02/2024 Clear  Clear Final    Spec Gravity 12/02/2024 1.013  1.005 - 1.030 Final    Glucose Urine 12/02/2024 Normal  Normal mg/dL Final    Bilirubin Urine 12/02/2024 Negative  Negative Final    Ketones Urine 12/02/2024 Negative  Negative mg/dL Final    Blood Urine 12/02/2024 Negative  Negative Final    pH Urine 12/02/2024 6.0  5.0 - 8.0 Final    Protein Urine 12/02/2024 Negative  Negative mg/dL Final    Urobilinogen Urine 12/02/2024 Normal  Normal Final    Nitrite Urine 12/02/2024 Negative   Negative Final    Leukocyte Esterase Urine 12/02/2024 Negative  Negative Final    Microscopic 12/02/2024 Microscopic not indicated   Final    HgbA1C 12/02/2024 6.4 (H)  <5.7 % Final    Estimated Average Glucose 12/02/2024 137 (H)  68 - 126 mg/dL Final   [facility] Lab Encounter on 12/02/2024   Component Date Value Ref Range Status    Ventricular rate 12/02/2024 64  BPM Final    Atrial rate 12/02/2024 64  BPM Final    P-R Interval 12/02/2024 182  ms Final    QRS Duration 12/02/2024 78  ms Final    Q-T Interval 12/02/2024 402  ms Final    QTC Calculation (Bezet) 12/02/2024 414  ms Final    P Axis 12/02/2024 58  degrees Final    R Axis 12/02/2024 -65  degrees Final    T Axis 12/02/2024 56  degrees Final   ]      Radiology Imaging:  I reviewed with the patient her X-ray of the shoulder left   PROCEDURE: XR SHOULDER, COMPLETE (MIN 2 VIEWS), LEFT (CPT=73030)     COMPARISON: None.     INDICATIONS: Acute pain of left shoulder,for a few weeks no known injury.     TECHNIQUE: Bone mineralization is slightly diminished.  views were obtained.       FINDINGS:        There is no acute fracture/dislocation.     There are slight to moderate degenerative changes within the left shoulder manifested by bony hypertrophy, articular space narrowing, and subarticular sclerosis.  These are most significant at the acromioclavicular articulation.                  Impression   CONCLUSION: Slight to moderate degenerative changes within the left shoulder.  These are most significant at the acromioclavicular articulation.           Dictated by (CST): Keanu James MD on 1/20/2025 at 9:22 AM      Finalized by (CST): Keanu James MD on 1/20/2025 at 9:28 AM         ASSESSMENT AND PLAN:  Chiquita is a pleasant 69-year-old female who presents for follow-up of her left shoulder pain.  Her visit was performed with an Bengali .  She is doing very well and rates her pain a 0 out of 10.  She should continue her home exercise program and use Tylenol  as needed for pain.  She will follow-up with me as needed going forward.     Assessment & Plan  Chronic left shoulder pain  Pain improved to 0/10. Physical therapy beneficial. Discontinued muscle relaxer due to hypertension.  - Continue home exercises.  - Consider return to physical therapy if pain recurs.  - Consider injection if pain recurs.      RTC in PRN  Discharge Instructions were provided as documented in AVS summary.  The patient was in agreement with the assessment and plan.  All questions were answered.  There were no barriers to learning.         1. Chronic left shoulder pain    2. Tendinopathy of rotator cuff, unspecified laterality    3. Osteoarthritis of left glenohumeral joint    4. Arthritis of left acromioclavicular joint        Alex B. Behar MD  Physical Medicine and Rehabilitation/Sports Medicine  [facility]  [employer] Cures Act Notice to Patient: Medical documents like this are made available to patients in the interest of transparency. However, be advised this is a medical document and it is intended as [id number] communication between your medical providers. This medical document may contain abbreviations, assessments, medical data, and results or other terms that are unfamiliar. Medical documents are intended to carry relevant information, facts as evident, and the clinical opinion of the practitioner. As such, this medical document may be written in language that appears blunt or direct. You are encouraged to contact your medical provider and/or [facility] Patient Experience if you have any questions about this medical document.   [employer] tool was used for dictation purposes only and the patient was not recorded at any point during the visit.              [1] No Known Allergies

## 2025-04-09 ENCOUNTER — APPOINTMENT (OUTPATIENT)
Dept: PHYSICAL THERAPY | Facility: HOSPITAL | Age: 70
End: 2025-04-09
Attending: INTERNAL MEDICINE
Payer: MEDICARE

## 2025-04-16 ENCOUNTER — APPOINTMENT (OUTPATIENT)
Dept: PHYSICAL THERAPY | Facility: HOSPITAL | Age: 70
End: 2025-04-16
Attending: INTERNAL MEDICINE
Payer: MEDICARE

## 2025-05-20 ENCOUNTER — TELEPHONE (OUTPATIENT)
Dept: INTERNAL MEDICINE CLINIC | Facility: CLINIC | Age: 70
End: 2025-05-20

## 2025-05-20 DIAGNOSIS — H54.7 VISION PROBLEM: Primary | ICD-10-CM

## 2025-06-04 RX ORDER — SIMVASTATIN 10 MG
10 TABLET ORAL NIGHTLY
Qty: 90 TABLET | Refills: 3 | Status: SHIPPED | OUTPATIENT
Start: 2025-06-04

## 2025-06-10 ENCOUNTER — TELEPHONE (OUTPATIENT)
Dept: ENDOCRINOLOGY CLINIC | Facility: CLINIC | Age: 70
End: 2025-06-10

## 2025-06-10 NOTE — TELEPHONE ENCOUNTER
----- Message from Anjli MARIELA sent at 12/27/2024 10:46 AM CST -----  Regarding: Prolia IV  Please submit prolia IV, upcoming visit with MD on 6/30

## 2025-06-10 NOTE — TELEPHONE ENCOUNTER
Pt's last Prolia injection was on 12/27/2024 with RN. Pt will be due for next injection on or after 06/28/25     Pt has an upcoming appt on 6/30/2025 for Prolia Injection with SH      Submitted Prolia IV via [employer] portal  Awaiting SOB, 3-5 business days

## 2025-06-17 ENCOUNTER — TELEPHONE (OUTPATIENT)
Dept: ENDOCRINOLOGY CLINIC | Facility: CLINIC | Age: 70
End: 2025-06-17

## 2025-06-17 NOTE — TELEPHONE ENCOUNTER
Received fax from [employer] stating pt was missing insurance and .    Faxed insurance card and provided  on fax cover sheet. Faxed to [phone]

## 2025-06-17 NOTE — TELEPHONE ENCOUNTER
Per TE on 6/17, pt's son called to reschedule prolia with MD on 6/30. 6/30 appt cancelled by call center. Please help reschedule, thanks!     Last injection with RN on 12/27, due on/after 6/28 with MD.

## 2025-07-01 ENCOUNTER — TELEPHONE (OUTPATIENT)
Dept: ENDOCRINOLOGY CLINIC | Facility: CLINIC | Age: 70
End: 2025-07-01

## 2025-07-01 NOTE — TELEPHONE ENCOUNTER
See TE 6/10/2025, will update pt's insurance prior to next injection next year, since insurance will be changing.

## 2025-07-01 NOTE — TELEPHONE ENCOUNTER
Josee RN at [employer] prior auth department calling regarding prior auth for Denosumab - Auth #  [id number]. Effective date 7/1/2025 through 12/31/2025. [employer] plan will no longer be active as of 12/31/2025, so nurse will need to check with the member to find out what new plan she will have after 12/31/2025. Nurse Tripp states that 60 units are approved for the Denosumab medication.

## 2025-07-08 ENCOUNTER — LAB ENCOUNTER (OUTPATIENT)
Dept: LAB | Facility: HOSPITAL | Age: 70
End: 2025-07-08
Attending: INTERNAL MEDICINE
Payer: MEDICARE

## 2025-07-08 ENCOUNTER — NURSE ONLY (OUTPATIENT)
Dept: ENDOCRINOLOGY CLINIC | Facility: CLINIC | Age: 70
End: 2025-07-08
Payer: MEDICARE

## 2025-07-08 DIAGNOSIS — M81.0 AGE-RELATED OSTEOPOROSIS WITHOUT CURRENT PATHOLOGICAL FRACTURE: Primary | ICD-10-CM

## 2025-07-08 DIAGNOSIS — M81.0 AGE-RELATED OSTEOPOROSIS WITHOUT CURRENT PATHOLOGICAL FRACTURE: ICD-10-CM

## 2025-07-08 LAB
PTH-INTACT SERPL-MCNC: 52.7 PG/ML (ref 18.5–88)
VIT D+METAB SERPL-MCNC: 38.8 NG/ML (ref 30–100)

## 2025-07-08 PROCEDURE — 84080 ASSAY ALKALINE PHOSPHATASES: CPT

## 2025-07-08 PROCEDURE — 82306 VITAMIN D 25 HYDROXY: CPT

## 2025-07-08 PROCEDURE — 83970 ASSAY OF PARATHORMONE: CPT

## 2025-07-08 PROCEDURE — 36415 COLL VENOUS BLD VENIPUNCTURE: CPT

## 2025-07-08 NOTE — PROGRESS NOTES
Patient seen today for prolia injection. Injection given to the upper left arm. Patient tolerated well.   used: Yes; #[id number]    Today this is the 2nd injection.  Last prolia injection on: 12/27/24  Summary of benefits completed: Yes  PA required/completed: Yes; see TE 6/10/25  Last Prolia protocol labs: Never  Last Dexa scan: 12/26/24     Patient advised to schedule 6-month follow up; appt sked with Dr. Grullon on 1/9/26.   Next labs due: ASAP; pt to complete this week  Future labs ordered: Yes      Staff message placed to MA pool ([facility] ENDO Medical Assistant) to perform repeat prolia insurance check in 5 months. -->done

## 2025-07-11 LAB — ALKALINE PHOSPHATASE BONE SPECIFIC: 11.9 UG/L

## 2025-08-01 ENCOUNTER — OFFICE VISIT (OUTPATIENT)
Dept: AUDIOLOGY | Facility: CLINIC | Age: 70
End: 2025-08-01

## 2025-08-01 ENCOUNTER — OFFICE VISIT (OUTPATIENT)
Dept: OTOLARYNGOLOGY | Facility: CLINIC | Age: 70
End: 2025-08-01

## 2025-08-01 DIAGNOSIS — H93.13 BILATERAL TINNITUS: Primary | ICD-10-CM

## 2025-08-01 DIAGNOSIS — H93.19 TINNITUS, UNSPECIFIED LATERALITY: Primary | ICD-10-CM

## 2025-08-01 DIAGNOSIS — H93.13 TINNITUS, BILATERAL: ICD-10-CM

## 2025-08-01 PROCEDURE — 92557 COMPREHENSIVE HEARING TEST: CPT | Performed by: AUDIOLOGIST

## 2025-08-01 PROCEDURE — 92567 TYMPANOMETRY: CPT | Performed by: AUDIOLOGIST

## 2025-08-05 RX ORDER — LEVOTHYROXINE SODIUM 100 UG/1
100 TABLET ORAL
Qty: 90 TABLET | Refills: 3 | Status: SHIPPED | OUTPATIENT
Start: 2025-08-05

## (undated) DEVICE — PAD PREP MED 2.6X1.7IN 70% ISO ALC 2 PLY ANSEP ABS STRL LF

## (undated) DEVICE — GLOVE SURG 6.5 PROTEXIS PI MIC LF CRM PF SMTH BEAD CUFF STRL

## (undated) DEVICE — TAPE SURG MCPR STD 10YDX3IN PAPER HPOAL BRTHBL LF WHT

## (undated) DEVICE — SHIELD OPHTHALMIC UNV SOFGUARD FLXB EDGE LF THRMPLST ELSTMR

## (undated) DEVICE — SOLUTION IO 15ML BSS NONANTIBIOTIC IRR NA K CA MG

## (undated) DEVICE — KNIFE OPHTHALMIC 2.4MM INTREPID ANG BVL STAB MIC COAX SLIT

## (undated) DEVICE — CANNULA IRR 30GA 4MM

## (undated) DEVICE — POSITIONER OR PUR 9IN MDCHC DONUT PU FOAM DISP

## (undated) DEVICE — NEEDLE FLTR 18GA 1.5IN 5UM REG WALL BLUNT BVL LL HUB DEHP-FR

## (undated) DEVICE — PACK PHACO .9MM 30D BAL TIP

## (undated) DEVICE — GLOVE SURG 7 PROTEXIS PI MIC LF CRM PF SMTH BEAD CUFF STRL

## (undated) DEVICE — CYSTOTOME IRR 25GA ANG FRM RVRS CUT STRL DISP

## (undated) DEVICE — DEVICE VSCELAS DUOVISC KIT SOL

## (undated) DEVICE — SOLUTION IRR 500ML BSS IO

## (undated) DEVICE — TIP SUCTIRR .03MM INTREPID 35D BENT PLMR DISP

## (undated) DEVICE — WATER STRL 1000ML PLASTIC POUR BTL LF

## (undated) DEVICE — SYRINGE 1ML SLIP TIP STRL TB LF DISP

## (undated) DEVICE — TAPE SURG MCPR STD 10YDX1IN PAPER HPOAL BRTHBL LF WHT

## (undated) DEVICE — PACK PHACO IL MASONIC

## (undated) DEVICE — GLOVE SURG 7.5 PROTEXIS PI MIC LF CRM PF SMTH BEAD CUFF STRL

## (undated) DEVICE — POSITIONER PSTN 9IN DONUT HEAD FOAM

## (undated) DEVICE — KNIFE OPHTHALMIC 1.4MM 20GA A-OK 5-LANCE STRGT CRNL SCLR